# Patient Record
Sex: FEMALE | Race: WHITE | NOT HISPANIC OR LATINO | Employment: PART TIME | URBAN - METROPOLITAN AREA
[De-identification: names, ages, dates, MRNs, and addresses within clinical notes are randomized per-mention and may not be internally consistent; named-entity substitution may affect disease eponyms.]

---

## 2018-04-05 ENCOUNTER — RECORDS - HEALTHEAST (OUTPATIENT)
Dept: LAB | Facility: CLINIC | Age: 33
End: 2018-04-05

## 2018-04-05 LAB
ALBUMIN SERPL-MCNC: 3.9 G/DL (ref 3.5–5)
ALP SERPL-CCNC: 79 U/L (ref 45–120)
ALT SERPL W P-5'-P-CCNC: 110 U/L (ref 0–45)
ANION GAP SERPL CALCULATED.3IONS-SCNC: 12 MMOL/L (ref 5–18)
AST SERPL W P-5'-P-CCNC: 50 U/L (ref 0–40)
BILIRUB SERPL-MCNC: 0.6 MG/DL (ref 0–1)
BUN SERPL-MCNC: 14 MG/DL (ref 8–22)
CALCIUM SERPL-MCNC: 10.1 MG/DL (ref 8.5–10.5)
CHLORIDE BLD-SCNC: 102 MMOL/L (ref 98–107)
CHOLEST SERPL-MCNC: 291 MG/DL
CO2 SERPL-SCNC: 23 MMOL/L (ref 22–31)
CREAT SERPL-MCNC: 0.78 MG/DL (ref 0.6–1.1)
ERYTHROCYTE [DISTWIDTH] IN BLOOD BY AUTOMATED COUNT: 11.9 % (ref 11–14.5)
FASTING STATUS PATIENT QL REPORTED: NO
GFR SERPL CREATININE-BSD FRML MDRD: >60 ML/MIN/1.73M2
GLUCOSE BLD-MCNC: 108 MG/DL (ref 70–125)
HCT VFR BLD AUTO: 42.5 % (ref 35–47)
HDLC SERPL-MCNC: 39 MG/DL
HGB BLD-MCNC: 14.8 G/DL (ref 12–16)
LDLC SERPL CALC-MCNC: 173 MG/DL
MCH RBC QN AUTO: 31.6 PG (ref 27–34)
MCHC RBC AUTO-ENTMCNC: 34.8 G/DL (ref 32–36)
MCV RBC AUTO: 91 FL (ref 80–100)
PLATELET # BLD AUTO: 346 THOU/UL (ref 140–440)
PMV BLD AUTO: 10.5 FL (ref 8.5–12.5)
POTASSIUM BLD-SCNC: 4.1 MMOL/L (ref 3.5–5)
PROT SERPL-MCNC: 7.8 G/DL (ref 6–8)
RBC # BLD AUTO: 4.69 MILL/UL (ref 3.8–5.4)
SODIUM SERPL-SCNC: 137 MMOL/L (ref 136–145)
TRIGL SERPL-MCNC: 395 MG/DL
TSH SERPL DL<=0.005 MIU/L-ACNC: 1.05 UIU/ML (ref 0.3–5)
WBC: 7.9 THOU/UL (ref 4–11)

## 2018-04-06 LAB
HPV SOURCE: NORMAL
HUMAN PAPILLOMA VIRUS 16 DNA: NEGATIVE
HUMAN PAPILLOMA VIRUS 18 DNA: NEGATIVE
HUMAN PAPILLOMA VIRUS FINAL DIAGNOSIS: NORMAL
HUMAN PAPILLOMA VIRUS OTHER HR: NEGATIVE
SPECIMEN DESCRIPTION: NORMAL

## 2018-04-11 ENCOUNTER — RECORDS - HEALTHEAST (OUTPATIENT)
Dept: ADMINISTRATIVE | Facility: OTHER | Age: 33
End: 2018-04-11

## 2018-06-20 ENCOUNTER — RECORDS - HEALTHEAST (OUTPATIENT)
Dept: LAB | Facility: CLINIC | Age: 33
End: 2018-06-20

## 2018-06-20 LAB
ALBUMIN SERPL-MCNC: 4 G/DL (ref 3.5–5)
ALP SERPL-CCNC: 69 U/L (ref 45–120)
ALT SERPL W P-5'-P-CCNC: 29 U/L (ref 0–45)
AST SERPL W P-5'-P-CCNC: 18 U/L (ref 0–40)
BILIRUB DIRECT SERPL-MCNC: <0.1 MG/DL
BILIRUB SERPL-MCNC: 0.5 MG/DL (ref 0–1)
PROT SERPL-MCNC: 7.6 G/DL (ref 6–8)

## 2018-10-16 ENCOUNTER — RECORDS - HEALTHEAST (OUTPATIENT)
Dept: LAB | Facility: CLINIC | Age: 33
End: 2018-10-16

## 2018-10-16 LAB
ALBUMIN SERPL-MCNC: 4.1 G/DL (ref 3.5–5)
ALP SERPL-CCNC: 68 U/L (ref 45–120)
ALT SERPL W P-5'-P-CCNC: 32 U/L (ref 0–45)
ANION GAP SERPL CALCULATED.3IONS-SCNC: 10 MMOL/L (ref 5–18)
AST SERPL W P-5'-P-CCNC: 22 U/L (ref 0–40)
BILIRUB SERPL-MCNC: 0.6 MG/DL (ref 0–1)
BUN SERPL-MCNC: 16 MG/DL (ref 8–22)
CALCIUM SERPL-MCNC: 9.7 MG/DL (ref 8.5–10.5)
CHLORIDE BLD-SCNC: 103 MMOL/L (ref 98–107)
CO2 SERPL-SCNC: 25 MMOL/L (ref 22–31)
CREAT SERPL-MCNC: 0.76 MG/DL (ref 0.6–1.1)
GFR SERPL CREATININE-BSD FRML MDRD: >60 ML/MIN/1.73M2
GLUCOSE BLD-MCNC: 84 MG/DL (ref 70–125)
POTASSIUM BLD-SCNC: 4.6 MMOL/L (ref 3.5–5)
PROT SERPL-MCNC: 7.5 G/DL (ref 6–8)
SODIUM SERPL-SCNC: 138 MMOL/L (ref 136–145)

## 2019-02-21 ENCOUNTER — RECORDS - HEALTHEAST (OUTPATIENT)
Dept: LAB | Facility: CLINIC | Age: 34
End: 2019-02-21

## 2019-02-23 LAB — BACTERIA SPEC CULT: NORMAL

## 2019-04-02 ENCOUNTER — RECORDS - HEALTHEAST (OUTPATIENT)
Dept: LAB | Facility: CLINIC | Age: 34
End: 2019-04-02

## 2019-04-02 LAB
ALBUMIN SERPL-MCNC: 4.4 G/DL (ref 3.5–5)
ALP SERPL-CCNC: 59 U/L (ref 45–120)
ALT SERPL W P-5'-P-CCNC: 27 U/L (ref 0–45)
ANION GAP SERPL CALCULATED.3IONS-SCNC: 11 MMOL/L (ref 5–18)
AST SERPL W P-5'-P-CCNC: 20 U/L (ref 0–40)
BILIRUB SERPL-MCNC: 0.9 MG/DL (ref 0–1)
BUN SERPL-MCNC: 11 MG/DL (ref 8–22)
CALCIUM SERPL-MCNC: 10 MG/DL (ref 8.5–10.5)
CHLORIDE BLD-SCNC: 103 MMOL/L (ref 98–107)
CHOLEST SERPL-MCNC: 250 MG/DL
CO2 SERPL-SCNC: 24 MMOL/L (ref 22–31)
CREAT SERPL-MCNC: 0.82 MG/DL (ref 0.6–1.1)
FASTING STATUS PATIENT QL REPORTED: ABNORMAL
GFR SERPL CREATININE-BSD FRML MDRD: >60 ML/MIN/1.73M2
GLUCOSE BLD-MCNC: 77 MG/DL (ref 70–125)
HDLC SERPL-MCNC: 36 MG/DL
LDLC SERPL CALC-MCNC: 190 MG/DL
POTASSIUM BLD-SCNC: 4.5 MMOL/L (ref 3.5–5)
PROT SERPL-MCNC: 7.6 G/DL (ref 6–8)
SODIUM SERPL-SCNC: 138 MMOL/L (ref 136–145)
TRIGL SERPL-MCNC: 118 MG/DL

## 2019-05-06 ENCOUNTER — RECORDS - HEALTHEAST (OUTPATIENT)
Dept: LAB | Facility: CLINIC | Age: 34
End: 2019-05-06

## 2019-05-06 LAB
CHOLEST SERPL-MCNC: 145 MG/DL
FASTING STATUS PATIENT QL REPORTED: YES
HDLC SERPL-MCNC: 32 MG/DL
LDLC SERPL CALC-MCNC: 98 MG/DL
TRIGL SERPL-MCNC: 77 MG/DL

## 2019-08-07 ENCOUNTER — RECORDS - HEALTHEAST (OUTPATIENT)
Dept: LAB | Facility: CLINIC | Age: 34
End: 2019-08-07

## 2019-08-07 LAB — CK SERPL-CCNC: 130 U/L (ref 30–190)

## 2019-10-01 ENCOUNTER — RECORDS - HEALTHEAST (OUTPATIENT)
Dept: LAB | Facility: CLINIC | Age: 34
End: 2019-10-01

## 2019-10-01 LAB
ALBUMIN SERPL-MCNC: 4.7 G/DL (ref 3.5–5)
ALP SERPL-CCNC: 81 U/L (ref 45–120)
ALT SERPL W P-5'-P-CCNC: 35 U/L (ref 0–45)
ANION GAP SERPL CALCULATED.3IONS-SCNC: 13 MMOL/L (ref 5–18)
AST SERPL W P-5'-P-CCNC: 26 U/L (ref 0–40)
BILIRUB SERPL-MCNC: 1 MG/DL (ref 0–1)
BUN SERPL-MCNC: 12 MG/DL (ref 8–22)
CALCIUM SERPL-MCNC: 10 MG/DL (ref 8.5–10.5)
CHLORIDE BLD-SCNC: 102 MMOL/L (ref 98–107)
CO2 SERPL-SCNC: 24 MMOL/L (ref 22–31)
CREAT SERPL-MCNC: 0.82 MG/DL (ref 0.6–1.1)
ERYTHROCYTE [DISTWIDTH] IN BLOOD BY AUTOMATED COUNT: 11.9 % (ref 11–14.5)
GFR SERPL CREATININE-BSD FRML MDRD: >60 ML/MIN/1.73M2
GLUCOSE BLD-MCNC: 74 MG/DL (ref 70–125)
HCT VFR BLD AUTO: 41.8 % (ref 35–47)
HGB BLD-MCNC: 14 G/DL (ref 12–16)
MCH RBC QN AUTO: 30.4 PG (ref 27–34)
MCHC RBC AUTO-ENTMCNC: 33.5 G/DL (ref 32–36)
MCV RBC AUTO: 91 FL (ref 80–100)
PLATELET # BLD AUTO: 268 THOU/UL (ref 140–440)
PMV BLD AUTO: 10.8 FL (ref 8.5–12.5)
POTASSIUM BLD-SCNC: 3.8 MMOL/L (ref 3.5–5)
PROT SERPL-MCNC: 7.7 G/DL (ref 6–8)
RBC # BLD AUTO: 4.6 MILL/UL (ref 3.8–5.4)
SODIUM SERPL-SCNC: 139 MMOL/L (ref 136–145)
WBC: 5.9 THOU/UL (ref 4–11)

## 2020-01-03 ENCOUNTER — RECORDS - HEALTHEAST (OUTPATIENT)
Dept: LAB | Facility: CLINIC | Age: 35
End: 2020-01-03

## 2020-01-05 LAB — BACTERIA SPEC CULT: NORMAL

## 2020-01-31 ENCOUNTER — RECORDS - HEALTHEAST (OUTPATIENT)
Dept: LAB | Facility: CLINIC | Age: 35
End: 2020-01-31

## 2020-02-02 LAB — BACTERIA SPEC CULT: NORMAL

## 2020-07-06 ENCOUNTER — RECORDS - HEALTHEAST (OUTPATIENT)
Dept: LAB | Facility: CLINIC | Age: 35
End: 2020-07-06

## 2020-07-06 LAB
ALBUMIN SERPL-MCNC: 4.2 G/DL (ref 3.5–5)
ALP SERPL-CCNC: 80 U/L (ref 45–120)
ALT SERPL W P-5'-P-CCNC: 66 U/L (ref 0–45)
ANION GAP SERPL CALCULATED.3IONS-SCNC: 11 MMOL/L (ref 5–18)
AST SERPL W P-5'-P-CCNC: 35 U/L (ref 0–40)
BILIRUB SERPL-MCNC: 1 MG/DL (ref 0–1)
BUN SERPL-MCNC: 12 MG/DL (ref 8–22)
CALCIUM SERPL-MCNC: 9.5 MG/DL (ref 8.5–10.5)
CHLORIDE BLD-SCNC: 100 MMOL/L (ref 98–107)
CHOLEST SERPL-MCNC: 140 MG/DL
CO2 SERPL-SCNC: 27 MMOL/L (ref 22–31)
CREAT SERPL-MCNC: 0.81 MG/DL (ref 0.6–1.1)
FASTING STATUS PATIENT QL REPORTED: ABNORMAL
GFR SERPL CREATININE-BSD FRML MDRD: >60 ML/MIN/1.73M2
GLUCOSE BLD-MCNC: 80 MG/DL (ref 70–125)
HDLC SERPL-MCNC: 40 MG/DL
LDLC SERPL CALC-MCNC: 80 MG/DL
POTASSIUM BLD-SCNC: 4.5 MMOL/L (ref 3.5–5)
PROT SERPL-MCNC: 7.4 G/DL (ref 6–8)
SODIUM SERPL-SCNC: 138 MMOL/L (ref 136–145)
TRIGL SERPL-MCNC: 101 MG/DL

## 2020-08-13 ENCOUNTER — RECORDS - HEALTHEAST (OUTPATIENT)
Dept: LAB | Facility: CLINIC | Age: 35
End: 2020-08-13

## 2020-08-13 LAB — TSH SERPL DL<=0.005 MIU/L-ACNC: 0.81 UIU/ML (ref 0.3–5)

## 2020-09-15 ENCOUNTER — RECORDS - HEALTHEAST (OUTPATIENT)
Dept: LAB | Facility: CLINIC | Age: 35
End: 2020-09-15

## 2020-09-15 LAB
ALBUMIN SERPL-MCNC: 4.7 G/DL (ref 3.5–5)
ALP SERPL-CCNC: 70 U/L (ref 45–120)
ALT SERPL W P-5'-P-CCNC: 37 U/L (ref 0–45)
ANION GAP SERPL CALCULATED.3IONS-SCNC: 11 MMOL/L (ref 5–18)
AST SERPL W P-5'-P-CCNC: 24 U/L (ref 0–40)
BILIRUB SERPL-MCNC: 0.6 MG/DL (ref 0–1)
BUN SERPL-MCNC: 10 MG/DL (ref 8–22)
CALCIUM SERPL-MCNC: 9.7 MG/DL (ref 8.5–10.5)
CHLORIDE BLD-SCNC: 102 MMOL/L (ref 98–107)
CHOLEST SERPL-MCNC: 129 MG/DL
CO2 SERPL-SCNC: 25 MMOL/L (ref 22–31)
CREAT SERPL-MCNC: 0.79 MG/DL (ref 0.6–1.1)
FASTING STATUS PATIENT QL REPORTED: ABNORMAL
GFR SERPL CREATININE-BSD FRML MDRD: >60 ML/MIN/1.73M2
GLUCOSE BLD-MCNC: 84 MG/DL (ref 70–125)
HDLC SERPL-MCNC: 42 MG/DL
LDLC SERPL CALC-MCNC: 74 MG/DL
POTASSIUM BLD-SCNC: 4 MMOL/L (ref 3.5–5)
PROT SERPL-MCNC: 7.7 G/DL (ref 6–8)
SODIUM SERPL-SCNC: 138 MMOL/L (ref 136–145)
TRIGL SERPL-MCNC: 64 MG/DL

## 2020-12-14 ENCOUNTER — RECORDS - HEALTHEAST (OUTPATIENT)
Dept: LAB | Facility: CLINIC | Age: 35
End: 2020-12-14

## 2020-12-15 LAB — BACTERIA SPEC CULT: NO GROWTH

## 2021-05-29 ENCOUNTER — RECORDS - HEALTHEAST (OUTPATIENT)
Dept: ADMINISTRATIVE | Facility: CLINIC | Age: 36
End: 2021-05-29

## 2021-06-09 ENCOUNTER — RECORDS - HEALTHEAST (OUTPATIENT)
Dept: LAB | Facility: CLINIC | Age: 36
End: 2021-06-09

## 2021-06-09 LAB — POTASSIUM BLD-SCNC: 4.2 MMOL/L (ref 3.5–5)

## 2021-06-19 ENCOUNTER — RECORDS - HEALTHEAST (OUTPATIENT)
Dept: LAB | Facility: CLINIC | Age: 36
End: 2021-06-19

## 2021-06-19 LAB
SARS-COV-2 PCR COMMENT: NORMAL
SARS-COV-2 RNA SPEC QL NAA+PROBE: NEGATIVE
SARS-COV-2 VIRUS SPECIMEN SOURCE: NORMAL

## 2021-07-28 ENCOUNTER — LAB REQUISITION (OUTPATIENT)
Dept: LAB | Facility: CLINIC | Age: 36
End: 2021-07-28

## 2021-07-28 DIAGNOSIS — E78.5 HYPERLIPIDEMIA, UNSPECIFIED: ICD-10-CM

## 2021-07-28 LAB
ALBUMIN SERPL-MCNC: 4.4 G/DL (ref 3.5–5)
ALP SERPL-CCNC: 63 U/L (ref 45–120)
ALT SERPL W P-5'-P-CCNC: 36 U/L (ref 0–45)
ANION GAP SERPL CALCULATED.3IONS-SCNC: 11 MMOL/L (ref 5–18)
AST SERPL W P-5'-P-CCNC: 20 U/L (ref 0–40)
BILIRUB SERPL-MCNC: 0.7 MG/DL (ref 0–1)
BUN SERPL-MCNC: 19 MG/DL (ref 8–22)
CALCIUM SERPL-MCNC: 9.8 MG/DL (ref 8.5–10.5)
CHLORIDE BLD-SCNC: 102 MMOL/L (ref 98–107)
CHOLEST SERPL-MCNC: 194 MG/DL
CO2 SERPL-SCNC: 28 MMOL/L (ref 22–31)
CREAT SERPL-MCNC: 0.82 MG/DL (ref 0.6–1.1)
GFR SERPL CREATININE-BSD FRML MDRD: >90 ML/MIN/1.73M2
GLUCOSE BLD-MCNC: 92 MG/DL (ref 70–125)
HDLC SERPL-MCNC: 53 MG/DL
LDLC SERPL CALC-MCNC: 123 MG/DL
POTASSIUM BLD-SCNC: 4.4 MMOL/L (ref 3.5–5)
PROT SERPL-MCNC: 7.6 G/DL (ref 6–8)
SODIUM SERPL-SCNC: 141 MMOL/L (ref 136–145)
TRIGL SERPL-MCNC: 90 MG/DL
TSH SERPL DL<=0.005 MIU/L-ACNC: 1.63 UIU/ML (ref 0.3–5)

## 2021-07-28 PROCEDURE — 82040 ASSAY OF SERUM ALBUMIN: CPT | Performed by: FAMILY MEDICINE

## 2021-07-28 PROCEDURE — 80061 LIPID PANEL: CPT | Performed by: FAMILY MEDICINE

## 2021-07-28 PROCEDURE — 36415 COLL VENOUS BLD VENIPUNCTURE: CPT | Performed by: FAMILY MEDICINE

## 2021-07-28 PROCEDURE — 84443 ASSAY THYROID STIM HORMONE: CPT | Performed by: FAMILY MEDICINE

## 2022-07-11 ENCOUNTER — LAB REQUISITION (OUTPATIENT)
Dept: LAB | Facility: CLINIC | Age: 37
End: 2022-07-11

## 2022-07-11 DIAGNOSIS — E66.9 OBESITY, UNSPECIFIED: ICD-10-CM

## 2022-07-11 DIAGNOSIS — E78.5 HYPERLIPIDEMIA, UNSPECIFIED: ICD-10-CM

## 2022-07-11 LAB
ALBUMIN SERPL BCG-MCNC: 4.5 G/DL (ref 3.5–5.2)
ALP SERPL-CCNC: 78 U/L (ref 35–104)
ALT SERPL W P-5'-P-CCNC: 26 U/L (ref 10–35)
ANION GAP SERPL CALCULATED.3IONS-SCNC: 10 MMOL/L (ref 7–15)
AST SERPL W P-5'-P-CCNC: 20 U/L (ref 10–35)
BILIRUB SERPL-MCNC: 0.7 MG/DL
BUN SERPL-MCNC: 13.5 MG/DL (ref 6–20)
CALCIUM SERPL-MCNC: 9.2 MG/DL (ref 8.6–10)
CHLORIDE SERPL-SCNC: 101 MMOL/L (ref 98–107)
CHOLEST SERPL-MCNC: 151 MG/DL
CREAT SERPL-MCNC: 0.78 MG/DL (ref 0.51–0.95)
DEPRECATED HCO3 PLAS-SCNC: 28 MMOL/L (ref 22–29)
GFR SERPL CREATININE-BSD FRML MDRD: >90 ML/MIN/1.73M2
GLUCOSE SERPL-MCNC: 88 MG/DL (ref 70–99)
HDLC SERPL-MCNC: 41 MG/DL
LDLC SERPL CALC-MCNC: 89 MG/DL
NONHDLC SERPL-MCNC: 110 MG/DL
POTASSIUM SERPL-SCNC: 4.3 MMOL/L (ref 3.4–5.3)
PROT SERPL-MCNC: 7 G/DL (ref 6.4–8.3)
SODIUM SERPL-SCNC: 139 MMOL/L (ref 136–145)
TRIGL SERPL-MCNC: 104 MG/DL
TSH SERPL DL<=0.005 MIU/L-ACNC: 1.37 UIU/ML (ref 0.3–4.2)

## 2022-07-11 PROCEDURE — 80053 COMPREHEN METABOLIC PANEL: CPT | Performed by: FAMILY MEDICINE

## 2022-07-11 PROCEDURE — 80061 LIPID PANEL: CPT | Performed by: FAMILY MEDICINE

## 2022-07-11 PROCEDURE — 84443 ASSAY THYROID STIM HORMONE: CPT | Performed by: FAMILY MEDICINE

## 2023-02-06 RX ORDER — ATORVASTATIN CALCIUM 40 MG/1
40 TABLET, FILM COATED ORAL EVERY EVENING
COMMUNITY

## 2023-02-06 RX ORDER — ESCITALOPRAM OXALATE 10 MG/1
15 TABLET ORAL DAILY
COMMUNITY

## 2023-02-06 RX ORDER — CHLORAL HYDRATE 500 MG
1 CAPSULE ORAL DAILY
COMMUNITY

## 2023-02-06 RX ORDER — DOXEPIN HYDROCHLORIDE 10 MG/1
10 CAPSULE ORAL
Status: ON HOLD | COMMUNITY
End: 2023-03-17

## 2023-02-06 RX ORDER — MECLIZINE HYDROCHLORIDE 25 MG/1
25 TABLET ORAL DAILY PRN
COMMUNITY

## 2023-02-06 RX ORDER — ALPRAZOLAM 0.5 MG
0.5 TABLET ORAL DAILY PRN
COMMUNITY

## 2023-02-06 RX ORDER — MULTIPLE VITAMINS W/ MINERALS TAB 9MG-400MCG
1 TAB ORAL DAILY
COMMUNITY

## 2023-02-27 ENCOUNTER — LAB REQUISITION (OUTPATIENT)
Dept: LAB | Facility: CLINIC | Age: 38
End: 2023-02-27

## 2023-02-27 DIAGNOSIS — E78.5 HYPERLIPIDEMIA, UNSPECIFIED: ICD-10-CM

## 2023-02-27 DIAGNOSIS — M17.32 UNILATERAL POST-TRAUMATIC OSTEOARTHRITIS, LEFT KNEE: ICD-10-CM

## 2023-02-27 PROCEDURE — 85025 COMPLETE CBC W/AUTO DIFF WBC: CPT | Performed by: STUDENT IN AN ORGANIZED HEALTH CARE EDUCATION/TRAINING PROGRAM

## 2023-02-27 PROCEDURE — 80053 COMPREHEN METABOLIC PANEL: CPT | Performed by: STUDENT IN AN ORGANIZED HEALTH CARE EDUCATION/TRAINING PROGRAM

## 2023-02-28 LAB
ALBUMIN SERPL BCG-MCNC: 4.4 G/DL (ref 3.5–5.2)
ALP SERPL-CCNC: 95 U/L (ref 35–104)
ALT SERPL W P-5'-P-CCNC: 55 U/L (ref 10–35)
ANION GAP SERPL CALCULATED.3IONS-SCNC: 13 MMOL/L (ref 7–15)
AST SERPL W P-5'-P-CCNC: 38 U/L (ref 10–35)
BASOPHILS # BLD AUTO: 0 10E3/UL (ref 0–0.2)
BASOPHILS NFR BLD AUTO: 1 %
BILIRUB SERPL-MCNC: 0.4 MG/DL
BUN SERPL-MCNC: 11.3 MG/DL (ref 6–20)
CALCIUM SERPL-MCNC: 9.5 MG/DL (ref 8.6–10)
CHLORIDE SERPL-SCNC: 102 MMOL/L (ref 98–107)
CREAT SERPL-MCNC: 0.75 MG/DL (ref 0.51–0.95)
DEPRECATED HCO3 PLAS-SCNC: 23 MMOL/L (ref 22–29)
EOSINOPHIL # BLD AUTO: 0.1 10E3/UL (ref 0–0.7)
EOSINOPHIL NFR BLD AUTO: 1 %
ERYTHROCYTE [DISTWIDTH] IN BLOOD BY AUTOMATED COUNT: 12 % (ref 10–15)
GFR SERPL CREATININE-BSD FRML MDRD: >90 ML/MIN/1.73M2
GLUCOSE SERPL-MCNC: 98 MG/DL (ref 70–99)
HCT VFR BLD AUTO: 42.6 % (ref 35–47)
HGB BLD-MCNC: 14.2 G/DL (ref 11.7–15.7)
IMM GRANULOCYTES # BLD: 0 10E3/UL
IMM GRANULOCYTES NFR BLD: 1 %
LYMPHOCYTES # BLD AUTO: 1.9 10E3/UL (ref 0.8–5.3)
LYMPHOCYTES NFR BLD AUTO: 29 %
MCH RBC QN AUTO: 30.4 PG (ref 26.5–33)
MCHC RBC AUTO-ENTMCNC: 33.3 G/DL (ref 31.5–36.5)
MCV RBC AUTO: 91 FL (ref 78–100)
MONOCYTES # BLD AUTO: 0.5 10E3/UL (ref 0–1.3)
MONOCYTES NFR BLD AUTO: 7 %
NEUTROPHILS # BLD AUTO: 4.1 10E3/UL (ref 1.6–8.3)
NEUTROPHILS NFR BLD AUTO: 61 %
NRBC # BLD AUTO: 0 10E3/UL
NRBC BLD AUTO-RTO: 0 /100
PLAT MORPH BLD: NORMAL
PLATELET # BLD AUTO: 278 10E3/UL (ref 150–450)
POTASSIUM SERPL-SCNC: 4.1 MMOL/L (ref 3.4–5.3)
PROT SERPL-MCNC: 7.2 G/DL (ref 6.4–8.3)
RBC # BLD AUTO: 4.67 10E6/UL (ref 3.8–5.2)
RBC MORPH BLD: NORMAL
SODIUM SERPL-SCNC: 138 MMOL/L (ref 136–145)
WBC # BLD AUTO: 6.6 10E3/UL (ref 4–11)

## 2023-03-07 NOTE — PROGRESS NOTES
Discharge plan according to [facility]:       02/06/23 1151   Discharge Planning   Patient/Family Anticipates Transition to home   Concerns to be Addressed all concerns addressed in this encounter   Living Arrangements   People in Home parent(s)   Type of Residence Private Residence   Is your private residence a single family home or apartment? Single family home   Stair Railings, Within Home, Primary railings safe and in good condition   Bathroom Shower/Tub Tub/Shower unit   Equipment Currently Used at Home none   Support System   Support Systems Parent   Do you have someone available to stay with you one or two nights once you are home? Yes

## 2023-03-16 NOTE — TREATMENT PLAN
Orthopedic Surgery Pre-Op Plan: Crystal Galvan  pre-op review. This is NOT an H&P   Surgeon: Dr. Pro    [facility]: [facility]  Name of Surgery: Left Total Knee Arthroplasty  Date of Surgery: 3/17/23  H&P: Completed on 2/27/23 by Dr. Dillan Renteria at [facility].   History of ASA, NSAIDS, vitamin and/or herbal supplements within 10 days: Yes- Multivitamin, Fish Oil-patient instructed to hold these vitamins and supplements for 7 days before surgery.  History of blood thinners: No    Plan:   1) Discharge Plan: Home morning of POD 1 with assist of Parents. Please see Discharge Planning section near bottom of this note for further details.     2) PONV: Reports history of nausea and vomiting following previous anesthesia.  I recommend patient discusses this with preop nursing and anesthesia on the day of surgery.  She would likely benefit from antiemetics and other measures to prevent or minimize nausea/vomiting.     3) Hyperlipidemia: On atorvastatin.    4) History of Seizures: as a child. No seizures since childhood and is not on any anti-seizure medications.     5) Obstructive Sleep Apnea: CPAP.  Reminded to bring CPAP machine to the hospital and use it whenever sleeping or napping.    6) Morbid Obesity: BMI 43, Wt: 285 lbs at recent preop exam.  I recommend continued efforts at safe weight loss following recovery from surgery. If patient is interested in further assistance with weight loss, please consider referral to [facility]. They offer both non-surgical and surgical evidence-based weight loss strategies. Call [phone] to schedule a consultation to learn more.      7) History of Motion Sickness: on meclizine.    8) Generalized Anxiety Disorder: On escitalopram, alprazolam (Xanax) and doxepin.     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 Dr. Pro confirmed he is aware of this allergy and has plans to use specific components ([employer])  because of this.      Patient appears medically optimized for upcoming surgery. I would recommend Hospitalist Consult to assist with medical management. Please call me below with any questions on this patient.       Review of Systems Notable for: PONV, Hyperlipidemia, history of seizures-as a child-no seizures since childhood, obstructive sleep apnea-on CPAP, morbid obesity, history of motion sickness, generalized anxiety disorder, nickel allergy-severe with rejection.     Past Medical History:   Past Medical History:   Diagnosis Date     Anxiety      Arthritis      Hyperlipidemia      Motion sickness      Obese      OCD (obsessive compulsive disorder)      PONV (postoperative nausea and vomiting)      PTSD (post-traumatic stress disorder)      Seizures (H)     none since childhood     Sleep apnea      Past Surgical History:   Procedure Laterality Date     ORTHOPEDIC SURGERY Left     arthroscopy       Current Medications:  Patient's Medications   New Prescriptions    No medications on file   Previous Medications    ALPRAZOLAM (XANAX) 0.5 MG TABLET    Take by mouth 3 times daily as needed for anxiety    ATORVASTATIN (LIPITOR) 40 MG TABLET    Take 40 mg by mouth daily    DOXEPIN (SINEQUAN) 10 MG CAPSULE    Take 10 mg by mouth nightly as needed 1/2 tab    ESCITALOPRAM (LEXAPRO) 10 MG TABLET    Take 10 mg by mouth daily 1 1/2 tabs    FISH OIL-OMEGA-3 FATTY ACIDS 500 MG CAPSULE        MECLIZINE (ANTIVERT) 25 MG TABLET    Take 25 mg by mouth 3 times daily as needed for dizziness    MULTIVITAMIN W/MINERALS (THERA-VIT-M) TABLET    Take 1 tablet by mouth daily   Modified Medications    No medications on file   Discontinued Medications    No medications on file       ALLERGIES:  Allergies   Allergen Reactions     Aspirin Nausea and Vomiting     dizzy     Nickel      Severe reaction with rejection, Dr. Pro aware for TJA.      Latex Rash     Added based on information entered during case entry, please review and add reactions,  type, and severity as needed       Social History  Social History     Tobacco Use     Smoking status: Never     Smokeless tobacco: Never   Substance Use Topics     Alcohol use: Not Currently     Drug use: Not Currently       Any Abnormal Recent Diagnostics? Yes  AST 38, ALT 55 on 2/27/23: mildly elevated. Should not affect proceeding with surgery. PCP plans to recheck these in about 3 months.     Discharge Planning:   Discharge plan according to [facility]:      Home POD 1 with assist Parents    02/06/23 1151   Discharge Planning   Patient/Family Anticipates Transition to home   Concerns to be Addressed all concerns addressed in this encounter   Living Arrangements   People in Home parent(s)   Type of Residence Private Residence   Is your private residence a single family home or apartment? Single family home   Stair Railings, Within Home, Primary railings safe and in good condition   Bathroom Shower/Tub Tub/Shower unit   Equipment Currently Used at Home none   Support System   Support Systems Parent   Do you have someone available to stay with you one or two nights once you are home? Yes       YOVANI Carney, CNP   Advanced Practice Nurse Navigator- Orthopedics  [facility]   Phone: [phone]

## 2023-03-17 ENCOUNTER — HOSPITAL ENCOUNTER (OUTPATIENT)
Facility: CLINIC | Age: 38
Discharge: HOME | End: 2023-03-18
Attending: ORTHOPAEDIC SURGERY | Admitting: ORTHOPAEDIC SURGERY
Payer: COMMERCIAL

## 2023-03-17 ENCOUNTER — APPOINTMENT (OUTPATIENT)
Dept: PHYSICAL THERAPY | Facility: CLINIC | Age: 38
End: 2023-03-17
Attending: ORTHOPAEDIC SURGERY
Payer: COMMERCIAL

## 2023-03-17 ENCOUNTER — APPOINTMENT (OUTPATIENT)
Dept: RADIOLOGY | Facility: CLINIC | Age: 38
End: 2023-03-17
Attending: ORTHOPAEDIC SURGERY
Payer: COMMERCIAL

## 2023-03-17 ENCOUNTER — ANESTHESIA (OUTPATIENT)
Dept: SURGERY | Facility: CLINIC | Age: 38
End: 2023-03-17
Payer: COMMERCIAL

## 2023-03-17 ENCOUNTER — ANESTHESIA EVENT (OUTPATIENT)
Dept: SURGERY | Facility: CLINIC | Age: 38
End: 2023-03-17
Payer: COMMERCIAL

## 2023-03-17 DIAGNOSIS — Z96.60 STATUS POST JOINT REPLACEMENT: Primary | ICD-10-CM

## 2023-03-17 PROCEDURE — 250N000011 HC RX IP 250 OP 636

## 2023-03-17 PROCEDURE — 97116 GAIT TRAINING THERAPY: CPT | Mod: GP

## 2023-03-17 PROCEDURE — 97110 THERAPEUTIC EXERCISES: CPT | Mod: GP

## 2023-03-17 PROCEDURE — 258N000003 HC RX IP 258 OP 636: Performed by: NURSE ANESTHETIST, CERTIFIED REGISTERED

## 2023-03-17 PROCEDURE — 250N000013 HC RX MED GY IP 250 OP 250 PS 637: Performed by: PHYSICIAN ASSISTANT

## 2023-03-17 PROCEDURE — 250N000011 HC RX IP 250 OP 636: Performed by: ORTHOPAEDIC SURGERY

## 2023-03-17 PROCEDURE — 250N000009 HC RX 250: Performed by: ANESTHESIOLOGY

## 2023-03-17 PROCEDURE — 97161 PT EVAL LOW COMPLEX 20 MIN: CPT | Mod: GP

## 2023-03-17 PROCEDURE — 999N000065 XR KNEE PORT LEFT 1/2 VIEWS: Mod: LT

## 2023-03-17 PROCEDURE — 710N000010 HC RECOVERY PHASE 1, LEVEL 2, PER MIN: Performed by: ORTHOPAEDIC SURGERY

## 2023-03-17 PROCEDURE — 99204 OFFICE O/P NEW MOD 45 MIN: CPT | Performed by: STUDENT IN AN ORGANIZED HEALTH CARE EDUCATION/TRAINING PROGRAM

## 2023-03-17 PROCEDURE — 250N000009 HC RX 250: Performed by: PHYSICIAN ASSISTANT

## 2023-03-17 PROCEDURE — 272N000001 HC OR GENERAL SUPPLY STERILE: Performed by: ORTHOPAEDIC SURGERY

## 2023-03-17 PROCEDURE — 258N000003 HC RX IP 258 OP 636

## 2023-03-17 PROCEDURE — 250N000011 HC RX IP 250 OP 636: Performed by: PHYSICIAN ASSISTANT

## 2023-03-17 PROCEDURE — 250N000009 HC RX 250: Performed by: NURSE ANESTHETIST, CERTIFIED REGISTERED

## 2023-03-17 PROCEDURE — 250N000013 HC RX MED GY IP 250 OP 250 PS 637

## 2023-03-17 PROCEDURE — 360N000077 HC SURGERY LEVEL 4, PER MIN: Performed by: ORTHOPAEDIC SURGERY

## 2023-03-17 PROCEDURE — 250N000013 HC RX MED GY IP 250 OP 250 PS 637: Performed by: ORTHOPAEDIC SURGERY

## 2023-03-17 PROCEDURE — 250N000011 HC RX IP 250 OP 636: Performed by: NURSE ANESTHETIST, CERTIFIED REGISTERED

## 2023-03-17 PROCEDURE — 258N000003 HC RX IP 258 OP 636: Performed by: ORTHOPAEDIC SURGERY

## 2023-03-17 PROCEDURE — 999N000141 HC STATISTIC PRE-PROCEDURE NURSING ASSESSMENT: Performed by: ORTHOPAEDIC SURGERY

## 2023-03-17 PROCEDURE — C1776 JOINT DEVICE (IMPLANTABLE): HCPCS | Performed by: ORTHOPAEDIC SURGERY

## 2023-03-17 PROCEDURE — 370N000017 HC ANESTHESIA TECHNICAL FEE, PER MIN: Performed by: ORTHOPAEDIC SURGERY

## 2023-03-17 DEVICE — LEGION CRUCIATE RETAINING OXINIUM                                    FEMORAL SIZE 5 LEFT
Type: IMPLANTABLE DEVICE | Site: KNEE | Status: FUNCTIONAL
Brand: LEGION

## 2023-03-17 DEVICE — LEGION HIGHLY CROSS LINKED                                    POLYETHYLENE DISHED INSERT SIZE 3-4 9MM
Type: IMPLANTABLE DEVICE | Site: KNEE | Status: FUNCTIONAL
Brand: LEGION

## 2023-03-17 DEVICE — GENESIS II NON-POROUS TIBIAL                                    BASEPLATE SIZE 4 LEFT
Type: IMPLANTABLE DEVICE | Site: KNEE | Status: FUNCTIONAL
Brand: GENESIS II

## 2023-03-17 DEVICE — IMPLANTABLE DEVICE: Type: IMPLANTABLE DEVICE | Site: KNEE | Status: FUNCTIONAL

## 2023-03-17 RX ORDER — CEFAZOLIN SODIUM/WATER 3 G/30 ML
3 SYRINGE (ML) INTRAVENOUS SEE ADMIN INSTRUCTIONS
Status: DISCONTINUED | OUTPATIENT
Start: 2023-03-17 | End: 2023-03-17 | Stop reason: HOSPADM

## 2023-03-17 RX ORDER — PROPOFOL 10 MG/ML
INJECTION, EMULSION INTRAVENOUS CONTINUOUS PRN
Status: DISCONTINUED | OUTPATIENT
Start: 2023-03-17 | End: 2023-03-17

## 2023-03-17 RX ORDER — OXYCODONE HYDROCHLORIDE 5 MG/1
10 TABLET ORAL EVERY 4 HOURS PRN
Status: DISCONTINUED | OUTPATIENT
Start: 2023-03-17 | End: 2023-03-18 | Stop reason: HOSPADM

## 2023-03-17 RX ORDER — FENTANYL CITRATE 50 UG/ML
50 INJECTION, SOLUTION INTRAMUSCULAR; INTRAVENOUS EVERY 5 MIN PRN
Status: DISCONTINUED | OUTPATIENT
Start: 2023-03-17 | End: 2023-03-17 | Stop reason: HOSPADM

## 2023-03-17 RX ORDER — SODIUM CHLORIDE, SODIUM LACTATE, POTASSIUM CHLORIDE, CALCIUM CHLORIDE 600; 310; 30; 20 MG/100ML; MG/100ML; MG/100ML; MG/100ML
INJECTION, SOLUTION INTRAVENOUS CONTINUOUS
Status: DISCONTINUED | OUTPATIENT
Start: 2023-03-17 | End: 2023-03-17 | Stop reason: HOSPADM

## 2023-03-17 RX ORDER — CEFAZOLIN SODIUM 2 G/100ML
2 INJECTION, SOLUTION INTRAVENOUS EVERY 8 HOURS
Status: COMPLETED | OUTPATIENT
Start: 2023-03-17 | End: 2023-03-18

## 2023-03-17 RX ORDER — POLYETHYLENE GLYCOL 3350 17 G/17G
17 POWDER, FOR SOLUTION ORAL DAILY
Status: DISCONTINUED | OUTPATIENT
Start: 2023-03-18 | End: 2023-03-18 | Stop reason: HOSPADM

## 2023-03-17 RX ORDER — CEFADROXIL 500 MG/1
500 CAPSULE ORAL 2 TIMES DAILY
Qty: 28 CAPSULE | Refills: 0 | Status: SHIPPED | OUTPATIENT
Start: 2023-03-17

## 2023-03-17 RX ORDER — FENTANYL CITRATE 50 UG/ML
50 INJECTION, SOLUTION INTRAMUSCULAR; INTRAVENOUS
Status: DISCONTINUED | OUTPATIENT
Start: 2023-03-17 | End: 2023-03-17 | Stop reason: HOSPADM

## 2023-03-17 RX ORDER — ONDANSETRON 4 MG/1
4 TABLET, ORALLY DISINTEGRATING ORAL EVERY 6 HOURS PRN
Status: DISCONTINUED | OUTPATIENT
Start: 2023-03-17 | End: 2023-03-18 | Stop reason: HOSPADM

## 2023-03-17 RX ORDER — VIT C/B6/B5/MAGNESIUM/HERB 173 50-5-6-5MG
1 CAPSULE ORAL EVERY EVENING
COMMUNITY

## 2023-03-17 RX ORDER — ONDANSETRON 4 MG/1
4 TABLET, ORALLY DISINTEGRATING ORAL EVERY 30 MIN PRN
Status: DISCONTINUED | OUTPATIENT
Start: 2023-03-17 | End: 2023-03-17 | Stop reason: HOSPADM

## 2023-03-17 RX ORDER — BISACODYL 10 MG
10 SUPPOSITORY, RECTAL RECTAL DAILY PRN
Status: DISCONTINUED | OUTPATIENT
Start: 2023-03-17 | End: 2023-03-18 | Stop reason: HOSPADM

## 2023-03-17 RX ORDER — TRANEXAMIC ACID 650 MG/1
1950 TABLET ORAL ONCE
Status: COMPLETED | OUTPATIENT
Start: 2023-03-17 | End: 2023-03-17

## 2023-03-17 RX ORDER — ALPRAZOLAM 0.5 MG
0.5 TABLET ORAL DAILY PRN
Status: DISCONTINUED | OUTPATIENT
Start: 2023-03-17 | End: 2023-03-18 | Stop reason: HOSPADM

## 2023-03-17 RX ORDER — HYDROMORPHONE HCL IN WATER/PF 6 MG/30 ML
0.4 PATIENT CONTROLLED ANALGESIA SYRINGE INTRAVENOUS EVERY 5 MIN PRN
Status: DISCONTINUED | OUTPATIENT
Start: 2023-03-17 | End: 2023-03-17 | Stop reason: HOSPADM

## 2023-03-17 RX ORDER — SODIUM CHLORIDE, SODIUM LACTATE, POTASSIUM CHLORIDE, CALCIUM CHLORIDE 600; 310; 30; 20 MG/100ML; MG/100ML; MG/100ML; MG/100ML
INJECTION, SOLUTION INTRAVENOUS CONTINUOUS
Status: DISCONTINUED | OUTPATIENT
Start: 2023-03-17 | End: 2023-03-18 | Stop reason: HOSPADM

## 2023-03-17 RX ORDER — ASPIRIN 81 MG/1
81 TABLET ORAL 2 TIMES DAILY
Status: DISCONTINUED | OUTPATIENT
Start: 2023-03-17 | End: 2023-03-17

## 2023-03-17 RX ORDER — VANCOMYCIN HYDROCHLORIDE 1 G/20ML
INJECTION, POWDER, LYOPHILIZED, FOR SOLUTION INTRAVENOUS PRN
Status: DISCONTINUED | OUTPATIENT
Start: 2023-03-17 | End: 2023-03-17 | Stop reason: HOSPADM

## 2023-03-17 RX ORDER — HYDROMORPHONE HCL IN WATER/PF 6 MG/30 ML
0.4 PATIENT CONTROLLED ANALGESIA SYRINGE INTRAVENOUS
Status: DISCONTINUED | OUTPATIENT
Start: 2023-03-17 | End: 2023-03-18 | Stop reason: HOSPADM

## 2023-03-17 RX ORDER — PROCHLORPERAZINE MALEATE 10 MG
10 TABLET ORAL EVERY 6 HOURS PRN
Status: DISCONTINUED | OUTPATIENT
Start: 2023-03-17 | End: 2023-03-18 | Stop reason: HOSPADM

## 2023-03-17 RX ORDER — LIDOCAINE HYDROCHLORIDE 20 MG/ML
INJECTION, SOLUTION INFILTRATION; PERINEURAL PRN
Status: DISCONTINUED | OUTPATIENT
Start: 2023-03-17 | End: 2023-03-17

## 2023-03-17 RX ORDER — ATORVASTATIN CALCIUM 40 MG/1
40 TABLET, FILM COATED ORAL EVERY EVENING
Status: DISCONTINUED | OUTPATIENT
Start: 2023-03-18 | End: 2023-03-18 | Stop reason: HOSPADM

## 2023-03-17 RX ORDER — DOXEPIN HYDROCHLORIDE 10 MG/ML
2 SOLUTION ORAL
COMMUNITY

## 2023-03-17 RX ORDER — FENTANYL CITRATE 50 UG/ML
25 INJECTION, SOLUTION INTRAMUSCULAR; INTRAVENOUS EVERY 5 MIN PRN
Status: DISCONTINUED | OUTPATIENT
Start: 2023-03-17 | End: 2023-03-17 | Stop reason: HOSPADM

## 2023-03-17 RX ORDER — DOXEPIN HYDROCHLORIDE 10 MG/ML
2 SOLUTION ORAL
Status: DISCONTINUED | OUTPATIENT
Start: 2023-03-17 | End: 2023-03-18 | Stop reason: HOSPADM

## 2023-03-17 RX ORDER — ONDANSETRON 2 MG/ML
4 INJECTION INTRAMUSCULAR; INTRAVENOUS EVERY 30 MIN PRN
Status: DISCONTINUED | OUTPATIENT
Start: 2023-03-17 | End: 2023-03-17 | Stop reason: HOSPADM

## 2023-03-17 RX ORDER — DEXAMETHASONE SODIUM PHOSPHATE 4 MG/ML
INJECTION, SOLUTION INTRA-ARTICULAR; INTRALESIONAL; INTRAMUSCULAR; INTRAVENOUS; SOFT TISSUE PRN
Status: DISCONTINUED | OUTPATIENT
Start: 2023-03-17 | End: 2023-03-17

## 2023-03-17 RX ORDER — ACETAMINOPHEN 325 MG/1
975 TABLET ORAL ONCE
Status: COMPLETED | OUTPATIENT
Start: 2023-03-17 | End: 2023-03-17

## 2023-03-17 RX ORDER — NALOXONE HYDROCHLORIDE 0.4 MG/ML
0.2 INJECTION, SOLUTION INTRAMUSCULAR; INTRAVENOUS; SUBCUTANEOUS
Status: DISCONTINUED | OUTPATIENT
Start: 2023-03-17 | End: 2023-03-18 | Stop reason: HOSPADM

## 2023-03-17 RX ORDER — CEFAZOLIN SODIUM/WATER 3 G/30 ML
3 SYRINGE (ML) INTRAVENOUS
Status: COMPLETED | OUTPATIENT
Start: 2023-03-17 | End: 2023-03-17

## 2023-03-17 RX ORDER — LIDOCAINE 40 MG/G
CREAM TOPICAL
Status: DISCONTINUED | OUTPATIENT
Start: 2023-03-17 | End: 2023-03-17 | Stop reason: HOSPADM

## 2023-03-17 RX ORDER — HYDROMORPHONE HCL IN WATER/PF 6 MG/30 ML
0.2 PATIENT CONTROLLED ANALGESIA SYRINGE INTRAVENOUS
Status: DISCONTINUED | OUTPATIENT
Start: 2023-03-17 | End: 2023-03-18 | Stop reason: HOSPADM

## 2023-03-17 RX ORDER — VANCOMYCIN HYDROCHLORIDE 1 G/20ML
INJECTION, POWDER, LYOPHILIZED, FOR SOLUTION INTRAVENOUS
Status: DISCONTINUED
Start: 2023-03-17 | End: 2023-03-17 | Stop reason: HOSPADM

## 2023-03-17 RX ORDER — PROPOFOL 10 MG/ML
INJECTION, EMULSION INTRAVENOUS PRN
Status: DISCONTINUED | OUTPATIENT
Start: 2023-03-17 | End: 2023-03-17

## 2023-03-17 RX ORDER — VITAMIN B COMPLEX
1 TABLET ORAL DAILY
COMMUNITY

## 2023-03-17 RX ORDER — ACETAMINOPHEN 325 MG/1
975 TABLET ORAL EVERY 8 HOURS
Status: DISCONTINUED | OUTPATIENT
Start: 2023-03-17 | End: 2023-03-18 | Stop reason: HOSPADM

## 2023-03-17 RX ORDER — ONDANSETRON 2 MG/ML
INJECTION INTRAMUSCULAR; INTRAVENOUS PRN
Status: DISCONTINUED | OUTPATIENT
Start: 2023-03-17 | End: 2023-03-17

## 2023-03-17 RX ORDER — FENTANYL CITRATE 50 UG/ML
100 INJECTION, SOLUTION INTRAMUSCULAR; INTRAVENOUS
Status: DISCONTINUED | OUTPATIENT
Start: 2023-03-17 | End: 2023-03-17 | Stop reason: HOSPADM

## 2023-03-17 RX ORDER — LIDOCAINE 40 MG/G
CREAM TOPICAL
Status: DISCONTINUED | OUTPATIENT
Start: 2023-03-17 | End: 2023-03-18 | Stop reason: HOSPADM

## 2023-03-17 RX ORDER — METHOCARBAMOL 750 MG/1
750 TABLET, FILM COATED ORAL EVERY 6 HOURS PRN
Status: DISCONTINUED | OUTPATIENT
Start: 2023-03-17 | End: 2023-03-18 | Stop reason: HOSPADM

## 2023-03-17 RX ORDER — KETOROLAC TROMETHAMINE 15 MG/ML
15 INJECTION, SOLUTION INTRAMUSCULAR; INTRAVENOUS EVERY 6 HOURS
Status: DISCONTINUED | OUTPATIENT
Start: 2023-03-17 | End: 2023-03-18 | Stop reason: HOSPADM

## 2023-03-17 RX ORDER — ONDANSETRON 2 MG/ML
4 INJECTION INTRAMUSCULAR; INTRAVENOUS EVERY 6 HOURS PRN
Status: DISCONTINUED | OUTPATIENT
Start: 2023-03-17 | End: 2023-03-18 | Stop reason: HOSPADM

## 2023-03-17 RX ORDER — NALOXONE HYDROCHLORIDE 0.4 MG/ML
0.4 INJECTION, SOLUTION INTRAMUSCULAR; INTRAVENOUS; SUBCUTANEOUS
Status: DISCONTINUED | OUTPATIENT
Start: 2023-03-17 | End: 2023-03-18 | Stop reason: HOSPADM

## 2023-03-17 RX ORDER — HYDROMORPHONE HCL IN WATER/PF 6 MG/30 ML
0.2 PATIENT CONTROLLED ANALGESIA SYRINGE INTRAVENOUS EVERY 5 MIN PRN
Status: DISCONTINUED | OUTPATIENT
Start: 2023-03-17 | End: 2023-03-17 | Stop reason: HOSPADM

## 2023-03-17 RX ORDER — OXYCODONE HYDROCHLORIDE 5 MG/1
5 TABLET ORAL EVERY 4 HOURS PRN
Status: DISCONTINUED | OUTPATIENT
Start: 2023-03-17 | End: 2023-03-18 | Stop reason: HOSPADM

## 2023-03-17 RX ORDER — HALOPERIDOL 5 MG/ML
1 INJECTION INTRAMUSCULAR
Status: DISCONTINUED | OUTPATIENT
Start: 2023-03-17 | End: 2023-03-17 | Stop reason: HOSPADM

## 2023-03-17 RX ORDER — BUPIVACAINE HYDROCHLORIDE 7.5 MG/ML
INJECTION, SOLUTION INTRASPINAL
Status: COMPLETED | OUTPATIENT
Start: 2023-03-17 | End: 2023-03-17

## 2023-03-17 RX ORDER — AMOXICILLIN 250 MG
1 CAPSULE ORAL 2 TIMES DAILY
Status: DISCONTINUED | OUTPATIENT
Start: 2023-03-17 | End: 2023-03-18 | Stop reason: HOSPADM

## 2023-03-17 RX ORDER — HYDROXYZINE HYDROCHLORIDE 25 MG/1
25 TABLET, FILM COATED ORAL EVERY 6 HOURS PRN
Status: DISCONTINUED | OUTPATIENT
Start: 2023-03-17 | End: 2023-03-18 | Stop reason: HOSPADM

## 2023-03-17 RX ORDER — ACETAMINOPHEN 325 MG/1
650 TABLET ORAL EVERY 4 HOURS PRN
Status: DISCONTINUED | OUTPATIENT
Start: 2023-03-20 | End: 2023-03-18 | Stop reason: HOSPADM

## 2023-03-17 RX ORDER — OXYCODONE HYDROCHLORIDE 5 MG/1
5 TABLET ORAL EVERY 6 HOURS PRN
Qty: 26 TABLET | Refills: 0 | Status: SHIPPED | OUTPATIENT
Start: 2023-03-17 | End: 2023-03-20

## 2023-03-17 RX ORDER — BUPIVACAINE HYDROCHLORIDE 5 MG/ML
INJECTION, SOLUTION EPIDURAL; INTRACAUDAL
Status: COMPLETED | OUTPATIENT
Start: 2023-03-17 | End: 2023-03-17

## 2023-03-17 RX ADMIN — Medication 3 G: at 09:50

## 2023-03-17 RX ADMIN — MIDAZOLAM HYDROCHLORIDE 2 MG: 1 INJECTION, SOLUTION INTRAMUSCULAR; INTRAVENOUS at 09:03

## 2023-03-17 RX ADMIN — PHENYLEPHRINE HYDROCHLORIDE 50 MCG: 10 INJECTION INTRAVENOUS at 10:57

## 2023-03-17 RX ADMIN — ACETAMINOPHEN 975 MG: 325 TABLET ORAL at 16:42

## 2023-03-17 RX ADMIN — DEXAMETHASONE SODIUM PHOSPHATE 10 MG: 4 INJECTION, SOLUTION INTRA-ARTICULAR; INTRALESIONAL; INTRAMUSCULAR; INTRAVENOUS; SOFT TISSUE at 10:35

## 2023-03-17 RX ADMIN — SENNOSIDES AND DOCUSATE SODIUM 1 TABLET: 50; 8.6 TABLET ORAL at 20:39

## 2023-03-17 RX ADMIN — BUPIVACAINE HYDROCHLORIDE IN DEXTROSE 1.6 ML: 7.5 INJECTION, SOLUTION SUBARACHNOID at 09:54

## 2023-03-17 RX ADMIN — TRANEXAMIC ACID 1950 MG: 650 TABLET ORAL at 08:31

## 2023-03-17 RX ADMIN — SODIUM CHLORIDE, POTASSIUM CHLORIDE, SODIUM LACTATE AND CALCIUM CHLORIDE: 600; 310; 30; 20 INJECTION, SOLUTION INTRAVENOUS at 08:40

## 2023-03-17 RX ADMIN — SODIUM CHLORIDE, POTASSIUM CHLORIDE, SODIUM LACTATE AND CALCIUM CHLORIDE: 600; 310; 30; 20 INJECTION, SOLUTION INTRAVENOUS at 11:11

## 2023-03-17 RX ADMIN — ONDANSETRON 4 MG: 2 INJECTION INTRAMUSCULAR; INTRAVENOUS at 09:52

## 2023-03-17 RX ADMIN — RIVAROXABAN 10 MG: 10 TABLET, FILM COATED ORAL at 20:39

## 2023-03-17 RX ADMIN — PROPOFOL 40 MG: 10 INJECTION, EMULSION INTRAVENOUS at 09:55

## 2023-03-17 RX ADMIN — FENTANYL CITRATE 100 MCG: 50 INJECTION, SOLUTION INTRAMUSCULAR; INTRAVENOUS at 09:03

## 2023-03-17 RX ADMIN — PROPOFOL 40 MG: 10 INJECTION, EMULSION INTRAVENOUS at 10:02

## 2023-03-17 RX ADMIN — CEFAZOLIN SODIUM 2 G: 2 INJECTION, SOLUTION INTRAVENOUS at 17:34

## 2023-03-17 RX ADMIN — SODIUM CHLORIDE, POTASSIUM CHLORIDE, SODIUM LACTATE AND CALCIUM CHLORIDE: 600; 310; 30; 20 INJECTION, SOLUTION INTRAVENOUS at 14:11

## 2023-03-17 RX ADMIN — ACETAMINOPHEN 975 MG: 325 TABLET ORAL at 08:31

## 2023-03-17 RX ADMIN — BUPIVACAINE HYDROCHLORIDE 20 ML: 5 INJECTION, SOLUTION EPIDURAL; INTRACAUDAL; PERINEURAL at 09:08

## 2023-03-17 RX ADMIN — KETOROLAC TROMETHAMINE 15 MG: 15 INJECTION, SOLUTION INTRAMUSCULAR; INTRAVENOUS at 16:42

## 2023-03-17 RX ADMIN — PROPOFOL 50 MCG/KG/MIN: 10 INJECTION, EMULSION INTRAVENOUS at 10:02

## 2023-03-17 RX ADMIN — LIDOCAINE HYDROCHLORIDE 20 MG: 20 INJECTION, SOLUTION INFILTRATION; PERINEURAL at 09:52

## 2023-03-17 RX ADMIN — PHENYLEPHRINE HYDROCHLORIDE 50 MCG: 10 INJECTION INTRAVENOUS at 10:58

## 2023-03-17 ASSESSMENT — ACTIVITIES OF DAILY LIVING (ADL)
ADLS_ACUITY_SCORE: 22
ADLS_ACUITY_SCORE: 21
ADLS_ACUITY_SCORE: 21
ADLS_ACUITY_SCORE: 22
ADLS_ACUITY_SCORE: 22
ADLS_ACUITY_SCORE: 21
ADLS_ACUITY_SCORE: 22
ADLS_ACUITY_SCORE: 21

## 2023-03-17 ASSESSMENT — ENCOUNTER SYMPTOMS: SEIZURES: 1

## 2023-03-17 NOTE — OP NOTE
Operative Report    PATIENT Crystal Galvan   DATE OF SURGERY:  3/17/2023    PREOPERATIVE DIAGNOSIS   Osteoarthritis of left knee [M17.12].    POSTOPERATIVE DIAGNOSIS   Osteoarthritis of left knee [M17.12].    PROCEDURE PERFORMED   LEFT total knee arthroplasty    IMPLANTS  Implant Name Type Inv. Item Serial No.  Lot No. LRB No. Used Action   IMP COMP FEM S&N GEN II REV POR CTD ZIRC SZ 5 LT [id number] - [id number] Total Joint Component/Insert IMP COMP FEM S&N GEN II REV POR CTD ZIRC SZ 5 LT [id number]  [employer] [id number] Left 1 Implanted   IMP BASEPLATE TIBIAL GAY II SZ 4 LT TI [id number] - [id number] Total Joint Component/Insert IMP BASEPLATE TIBIAL GAY II SZ 4 LT TI [id number]  [employer] [id number] Left 1 Implanted   IMP INSERT TIBIAL S&N LEGION XLPE DISHED SZ3-4 9MM [id number] - [id number] Total Joint Component/Insert IMP INSERT TIBIAL S&N LEGION XLPE DISHED SZ3-4 9MM [id number]  [employer] [id number] Left 1 Implanted   IMP COMP PATELLA S&N UHMWPE 29X8.5MM OVAL [id number] - [id number] Total Joint Component/Insert IMP COMP PATELLA S&N UHMWPE 29X8.5MM OVAL [id number]  [employer] [id number] Left 1 Implanted       SURGEON  Shashank Pro MD     ASSISTANT   Suad Solis PA-C; assistant was required for patient positioning, surgical assistance, wound closure and monitoring patient's safety throughout the case.    ANESTHESIA  Spinal with Block      FINDINGS:  Full thickness, tricompartmental OA.     SPECIMENS:  none    ESTIMATED BLOOD LOSS:  50cc    COMPLICATIONS   None.        INDICATION FOR PROCEDURE  Crystal Galvan is a 38 year old female who I evaluated in my clinic for severe LEFT knee pain.  X-rays confirmed end-stage osteoarthritis.  All conservative treatments were exhausted including: Activity modification, NSAIDs and intra-articular injections. These no longer provided symptom relief.  The patient's quality of life and activities of daily living were being greatly  affected.  Due to this, the patient was indicated for total knee arthroplasty.      PREOPERATIVE EXAMINATION:   No significant flexion contracture.     INFORMED CONSENT  Crystal Galvan was identified in the preoperative holding area and was identified using medical record number, name, and date of birth, all of which were confirmed. The operative extremity was marked using an indelible marker. Once again, the risks, benefits and expected outcomes of the procedure were discussed in full.  This discussion included, but was not limited to: Bleeding, nerve/vascular injury, fracture, instability, implant complications, continued pain, stiffness, scarring/incision numbness, infection, anesthetic/medical complications, death.  After this discussion patient elected to proceed with procedure, and did sign informed consent.    DESCRIPTION OF PROCEDURE   Crystal Galvan received a regional block in the preoperative holding area.  They were then brought back to the operating room and placed on the operating table in the supine position.  All bony prominences were well-padded.  A well-padded tourniquet was placed high on the operative thigh. The operative leg was then sterilely prepped and draped in the usual fashion with ChloraPrep.     A timeout was performed prior to the start of the procedure.  This confirmed the patient's name, correct site and side of surgery, and the procedure being performed.  Allergies were also confirmed.  All necessary implants were confirmed and available.  Administration of IV antibiotics and TXA were confirmed. Three independent staff members agreed with the information discussed in the timeout.     The leg was exsanguinated and the tourniquet was inflated.  A midline approach to the knee was utilized.  Sharp dissection was performed down to the level of the capsule. Medial and lateral skin flaps were raised.  A medial parapatellar arthrotomy was performed. The anterior horn of the  medial meniscus was resected and a medial release was performed around the tibial plateau. Medial osteophytes were resected.  The knee was extended and patellar fat pad was excised being careful to protect the patellar tendon.      Attention was turned back to the femur.  The ACL was sacrificed. This the opening drill was utilized to open the femoral canal.  The flexible intramedullary guide was then placed.  The distal femoral cutting jig was pinned into place in 5 degrees of valgus with a depth of 8 mm.  Intramedullary guide was then removed and distal femoral cut was then made.  This was ensured to be flat.  The pins were left in place and attention was turned to the tibia.     With the knee flexed and retractors placed to protect ligaments and vascular structures, the extramedullary tibial guide was placed.   The slope was checked and the distal aspect was centered on the ankle.  The jig was pinned, and depth of resection was verified.  The proximal tibial resection was performed using an oscillating saw being careful to protect the bone block, PCL and posterior structures.  The jig was removed with the pins were left in place.  At this point, the knee was brought down to full extension and a 9 mm spacer block was placed.  Checked the medial and lateral gaps which were found to be acceptable.  The 2 pins in the tibia and the 2 pins in the femur were then removed.     Attention was turned back to the distal femur.  The knee was flexed to 90 degrees.  The rotation of the flexion gap was checked against both Whitesides as well as the TEA and found to be 3 degrees.  4-in-1 cutting guide was then set to this amount of external rotation.  This was then applied to the distal femur and the femur was appropriately sized.  The  holes were drilled.  The 4-in-1 cutting guide was then pinned into place.  The posterior cut was made first and again a 9 mm spacer block was utilized to ensure that the flexion space would  accept a 9 mm poly.  Once this was done the remainder of the cuts were made.    A laminar  was placed in the lateral side.  The medial meniscus was removed.  The posterior medial osteophytes were removed.  The shoulder was then moved to the medial side and the lateral meniscus was removed.  The posterior lateral osteophytes were also removed.     At this point, we moved to trialing.  A femoral trial was placed.  A tibial baseplate with a 9 mm poly preloaded onto it were then placed onto the tibia and the knee was brought down into extension.  Full extension was obtained.  The medial-lateral balance was acceptable.  Flexion to 130 degrees was obtained.  The medial and lateral balance at 90 degrees of flexion was also found to be acceptable. Drawer exam was also found to be acceptable.     We then everted the patella and placed towel clamps.  This was cut with a freehand technique.   The patella was sized with a caliper, and an oscillating saw was used to make a flush cut.  The size of the patella was determined, and the lugs holes were drilled.  A trial patellar component was then placed onto the patella the patella was reduced and the knee was flexed up ensuring adequate patellar tracking. The lugs were drilled for the femur.      The trial components were then removed.  The proximal tibia was exposed with retractors in place.  The tibial component was appropriately sized and set to the appropriate rotation and then pinned into place.  The tibia was then punched.  The baseplate was then removed.  Local injection cocktail was placed in the soft tissues surrounding the knee.  The bony surfaces were then thoroughly irrigated and dried with a sponge in preparation for cementing.     The cement was mixed and the components were sequentially cemented.  The tibia was impacted down into place and excess cement was removed.  The final poly was placed into the tibial baseplate and this was reduced back under the  femur.  Cement was applied to the distal femur.  Femoral component was then impacted into place and cement was removed.  The knee was brought down into full extension.  Cement was applied to the patella and the patella was gently placed and the excess cement was again removed.  At this point, the cement was allowed to fully cure.    Again the balance both in flexion and extension was found to be adequate.  Full extension was obtained.  130 degrees of flexion was obtained.     The wound was closed in layers with a #2 vicryl and #1 Stratafix for the capsular closure, 2-0 vicryl, and 3-0 monocryl, followed by skin glue. The aquacel dressing was applied, followed by a full leg ACE wrap.      The patient was then transferred to the postoperative bed, awoken from anesthesia and taken to recovery in stable condition.  There were no complications.  The patient tolerated the procedure well.    POSTOPERATIVE EXAM:  Palpable dorsalis pedis pulse at the end of the case.    POSTOPERATIVE PLAN:  -Pain control  -PT/OT, WBAT  -24 hours antibiotics  -ASA for DVT ppx  -X-rays to be completed in PACU    Shashank Pro MD  [facility]

## 2023-03-17 NOTE — PLAN OF CARE

## 2023-03-17 NOTE — ANESTHESIA PREPROCEDURE EVALUATION
Anesthesia Pre-Procedure Evaluation    Patient: Crystal Galvan   MRN: [id number] : 1985        Procedure : Procedure(s):  LEFT TOTAL KNEE ARTHROPLASTY          Past Medical History:   Diagnosis Date     Anxiety      Arthritis      Hyperlipidemia      Motion sickness      Obese      OCD (obsessive compulsive disorder)      PONV (postoperative nausea and vomiting)      PTSD (post-traumatic stress disorder)      Seizures (H)     none since childhood     Sleep apnea       Past Surgical History:   Procedure Laterality Date     ORTHOPEDIC SURGERY Left     arthroscopy      Allergies   Allergen Reactions     Nickel      Severe reaction with rejection, Dr. Pro aware for TJA.      Aspirin Nausea and Vomiting     dizzy     Latex Rash     Added based on information entered during case entry, please review and add reactions, type, and severity as needed. Mild reaction, per patient      Social History     Tobacco Use     Smoking status: Never     Smokeless tobacco: Never   Substance Use Topics     Alcohol use: Not Currently      Wt Readings from Last 1 Encounters:   23 128.7 kg (283 lb 11.2 oz)        Anesthesia Evaluation   Pt has had prior anesthetic.     History of anesthetic complications  - PONV.      ROS/MED HX  ENT/Pulmonary:     (+) sleep apnea,     Neurologic:     (+) seizures,     Cardiovascular:     (+) Dyslipidemia -----    METS/Exercise Tolerance:     Hematologic:  - neg hematologic  ROS     Musculoskeletal:   (+) arthritis,     GI/Hepatic:  - neg GI/hepatic ROS     Renal/Genitourinary:  - neg Renal ROS     Endo:     (+) Obesity (BMI > 40),     Psychiatric/Substance Use:     (+) psychiatric history anxiety and other (comment) (OCD)     Infectious Disease:       Malignancy:       Other:            Physical Exam    Airway  airway exam normal      Mallampati: III   TM distance: > 3 FB   Neck ROM: full   Mouth opening: > 3 cm    Respiratory Devices and Support         Dental       (+) Minor  Abnormalities - some fillings, tiny chips      Cardiovascular   cardiovascular exam normal          Pulmonary   pulmonary exam normal                OUTSIDE LABS:  CBC:   Lab Results   Component Value Date    WBC 6.6 02/27/2023    WBC 5.9 10/01/2019    HGB 14.2 02/27/2023    HGB 14.0 10/01/2019    HCT 42.6 02/27/2023    HCT 41.8 10/01/2019     02/27/2023     10/01/2019     BMP:   Lab Results   Component Value Date     02/27/2023     07/11/2022    POTASSIUM 4.1 02/27/2023    POTASSIUM 4.3 07/11/2022    CHLORIDE 102 02/27/2023    CHLORIDE 101 07/11/2022    CO2 23 02/27/2023    CO2 28 07/11/2022    BUN 11.3 02/27/2023    BUN 13.5 07/11/2022    CR 0.75 02/27/2023    CR 0.78 07/11/2022    GLC 98 02/27/2023    GLC 88 07/11/2022     COAGS: No results found for: PTT, INR, FIBR  POC: No results found for: BGM, HCG, HCGS  HEPATIC:   Lab Results   Component Value Date    ALBUMIN 4.4 02/27/2023    PROTTOTAL 7.2 02/27/2023    ALT 55 (H) 02/27/2023    AST 38 (H) 02/27/2023    ALKPHOS 95 02/27/2023    BILITOTAL 0.4 02/27/2023     OTHER:   Lab Results   Component Value Date    ADNRAE 9.5 02/27/2023    TSH 1.37 07/11/2022       Anesthesia Plan    ASA Status:  3   NPO Status:  NPO Appropriate    Anesthesia Type: Spinal.   Induction: Intravenous.   Maintenance: Balanced.        Consents    Anesthesia Plan(s) and associated risks, benefits, and realistic alternatives discussed. Questions answered and patient/representative(s) expressed understanding.    - Discussed:     - Discussed with:  Patient, Parent (Mother and/or Father)      - Extended Intubation/Ventilatory Support Discussed: No.      - Patient is DNR/DNI Status: No         Postoperative Care    Pain management: Multi-modal analgesia, Peripheral nerve block (Single Shot).   PONV prophylaxis: Ondansetron (or other 5HT-3), Dexamethasone or Solumedrol     Comments:    Other Comments: Preop nerve block for postop pain control  Spinal anesthesia  1  PIV  Fentanyl and dilaudid if needed  Decadron, huber Mello MD

## 2023-03-17 NOTE — ANESTHESIA PROCEDURE NOTES
Adductor canal Procedure Note    Pre-Procedure   Staff -        Anesthesiologist:  Karolina Mello MD       Performed By: anesthesiologist       Location: pre-op       Procedure Start/Stop Times: 3/17/2023 9:08 AM and 3/17/2023 9:11 AM       Pre-Anesthestic Checklist: patient identified, IV checked, site marked, risks and benefits discussed, informed consent, monitors and equipment checked, pre-op evaluation, at physician/surgeon's request and post-op pain management  Timeout:       Correct Patient: Yes        Correct Procedure: Yes        Correct Site: Yes        Correct Position: Yes        Correct Laterality: Yes        Site Marked: Yes  Procedure Documentation  Procedure: Adductor canal       Laterality: left       Patient Position: supine       Patient Prep/Sterile Barriers: sterile gloves, mask       Skin prep: Chloraprep       Needle Gauge: 20.        Needle Length (Inches): 4        Ultrasound guided       1. Ultrasound was used to identify targeted nerve, plexus, vascular marker, or fascial plane and place a needle adjacent to it in real-time.       2. Ultrasound was used to visualize the spread of anesthetic in close proximity to the above referenced structure.       3. A permanent image is entered into the patient's record.       4. The visualized anatomic structures appeared normal.       5. There were no apparent abnormal pathologic findings.    Assessment/Narrative         The placement was negative for: blood aspirated, painful injection and site bleeding       Paresthesias: No.       Bolus given via needle..        Secured via.        Insertion/Infusion Method: Single Shot       Complications: none       Injection made incrementally with aspirations every 5 mL.    Medication(s) Administered   Bupivacaine 0.5% PF (Infiltration) - Infiltration   20 mL - 3/17/2023 9:08:00 AM  Medication Administration Time: 3/17/2023 9:08 AM      FOR [facility] ([facility]) ONLY:   Pain Team Contact information: please  "page the Pain Team Via [facility]. Search \"Pain\". During daytime hours, please page the attending first. At night please page the resident first.    "

## 2023-03-17 NOTE — INTERVAL H&P NOTE
"I have reviewed the surgical (or preoperative) H&P that is linked to this encounter, and examined the patient. There are no significant changes    Clinical Conditions Present on Arrival:  Clinically Significant Risk Factors Present on Admission                    # Severe Obesity: Estimated body mass index is 43.14 kg/m  as calculated from the following:    Height as of this encounter: 1.727 m (5' 8\").    Weight as of this encounter: 128.7 kg (283 lb 11.2 oz).       "

## 2023-03-17 NOTE — PROGRESS NOTES
03/17/23 1550   Appointment Info   Signing Clinician's Name / Credentials (PT) Shelbie Gordon, PT, DPT   Quick Adds   Quick Adds Certification   Living Environment   People in Home child(maria guadalupe), dependent;parent(s)  (Mother and son)   Current Living Arrangements house   Home Accessibility stairs to enter home;stairs within home   Number of Stairs, Main Entrance 1   Number of Stairs, Within Home, Primary greater than 10 stairs  (4 level home)   Stair Railings, Within Home, Primary railings safe and in good condition   Self-Care   Equipment Currently Used at Home none   Activity/Exercise/Self-Care Comment Has FWW   General Information   Onset of Illness/Injury or Date of Surgery 03/17/23   Referring Physician Dr. Shashank Pro   Patient/Family Therapy Goals Statement (PT) Be able to go for walks again   Pertinent History of Current Problem (include personal factors and/or comorbidities that impact the POC) S/P L TKA   Weight-Bearing Status - LLE weight-bearing as tolerated   Bed Mobility   Bed Mobility supine-sit   Supine-Sit [place] (Bed Mobility) supervision;verbal cues   Transfers   Transfers sit-stand transfer   Maintains Weight-bearing Status (Transfers) able to maintain   Sit-Stand Transfer   Sit-Stand [place] (Transfers) contact guard;verbal cues   Assistive Device (Sit-Stand Transfers) walker, front-wheeled   Gait/Stairs (Locomotion)   [place] Level (Gait) contact guard;verbal cues   Assistive Device (Gait) walker, front-wheeled   Distance in Feet 10   Distance in Feet (Gait) 150, 10x2   Pattern (Gait) step-to   Deviations/Abnormal Patterns (Gait) antalgic;dian decreased;gait speed decreased   Maintains Weight-bearing Status (Gait) able to maintain   Clinical Impression   Criteria for Skilled Therapeutic Intervention Yes, treatment indicated   PT Diagnosis (PT) Impaired functional mobility   Influenced by the following impairments weakness, pain, decreased ROM   Functional limitations due  to impairments transfers, gait, stairs   Clinical Presentation (PT Evaluation Complexity) Stable/Uncomplicated   Clinical Presentation Rationale Pt presents as medically diagnosed   Clinical Decision Making (Complexity) low complexity   Planned Therapy Interventions (PT) gait training;home exercise program;patient/family education;transfer training;stair training   Anticipated Equipment Needs at Discharge (PT) walker, rolling   Risk & Benefits of therapy have been explained evaluation/treatment results reviewed;care plan/treatment goals reviewed;patient   PT Total Evaluation Time   PT Eval, Low Complexity Minutes (90701) 10   Therapy Certification   Start of care date 03/17/23   Certification date from 03/17/23   Certification date to 04/14/23   Medical Diagnosis S/P L TKA   Physical Therapy Goals   PT Frequency Daily   PT Predicted Duration/Target Date for Goal Attainment 03/18/23   PT Goals Gait;Stairs;PT Goal 1   PT: Gait Rolling walker;Modified independent;100 feet   PT: Stairs Supervision/stand-by assist;8 stairs;Rail on both sides   PT: Goal 1 Independent TK HEP   Interventions   Interventions Quick Adds Gait Training;Therapeutic Activity;Therapeutic Procedure   Therapeutic Procedure/Exercise   Ther. Procedure: strength, endurance, ROM, flexibillity Minutes (85031) 10   Treatment Detail/Skilled Intervention TK exercises x 10 reps with L LE, SBA to CGA, verbal cueing for exercise technique   Therapeutic Activity   Symptoms Noted During/After Treatment None   Treatment Detail/Skilled Intervention Supine <>sit with HOB elevated, supervision. Sit<>Stand with FWW x 2 reps with CGA, verbal cueing for device advancement when turning to sit, safety, hand placement. Toilet transfer with FWW, CGA, verbal cueing for turning by toilet, device advancement.   Gait Training   Gait Training Minutes (28082) 15   Symptoms Noted During/After Treatment (Gait Training) increased pain   Treatment Detail/Skilled Intervention verbal  cueing for posture, safety, LE advancement. Education on post-op ambulation at home and icing   [place] Level (Gait Training) contact guard   Physical Assistance Level (Gait Training) verbal cues   Weight Bearing (Gait Training) weight-bearing as tolerated   Assistive Device (Gait Training) rolling walker   Pattern Analysis (Gait Training) swing-through gait   Gait Analysis Deviations decreased dian;decreased step length  (flexed posture)   Impairments (Gait Analysis/Training) pain   PT Discharge Planning   PT Plan Gait, stairs, TK exercises   PT Discharge Recommendation (DC Rec) (S)  home with outpatient physical therapy   PT Rationale for DC Rec Patient ambulating safely with FWW, support from family at home   PT Brief overview of current status Amb 150ft with FWW, CGA   Total Session Time   Timed Code Treatment Minutes 25   Total Session Time (sum of timed and untimed services) 35   [facility]  OUTPATIENT PHYSICAL THERAPY EVALUATION  PLAN OF TREATMENT FOR OUTPATIENT REHABILITATION  (COMPLETE FOR INITIAL CLAIMS ONLY)  Patient's Last Name, First Name, M.I.  YOB: 1985  Crystal Galvan                        Provider's Name  [facility] Medical Record No.  [id number]                             Onset Date:  03/17/23   Start of Care Date:  03/17/23   Type:     _X_PT   ___OT   ___SLP Medical Diagnosis:  S/P L TKA              PT Diagnosis:  Impaired functional mobility Visits from SOC:  1     See note for plan of treatment, functional goals and certification details    I CERTIFY THE NEED FOR THESE SERVICES FURNISHED UNDER        THIS PLAN OF TREATMENT AND WHILE UNDER MY CARE     (Physician co-signature of this document indicates review and certification of the therapy plan).

## 2023-03-17 NOTE — CONSULTS
"[facility]  Consult Note - Hospitalist Service  Date of Admission:  3/17/2023  Consult Requested by: Orthopedic surgery  Reason for Consult: Medical management of chronic medical conditions.    Assessment & Plan   Crystal Galvan is a 38 year old female admitted on 3/17/2023.  She has a past medical history significant for anxiety, PTSD and epilepsy who underwent elective left total knee arthroplasty on 3/17/2023.  Hospitalist medicine consulted for management of chronic medical conditions.      #Anxiety, PTSD  -No signs of anxiety attack.  -Received her home Lexapro prior to her surgery.    Plan:  [] Continue Lexapro 50 mg daily.  [] Continue home Xanax 0.5 mg as needed  [] Continue home doxepin 2 mg nightly as needed.      #Dyslipidemia    Plan:  [] Resume home atorvastatin tomorrow.         Clinically Significant Risk Factors Present on Admission                       # Severe Obesity: Estimated body mass index is 44.01 kg/m  as calculated from the following:    Height as of this encounter: 1.727 m (5' 8\").    Weight as of this encounter: 131.3 kg (289 lb 7.4 oz).           ALEM ARZATE MD  Hospitalist Service  Securely message with [employer] (more info)  Text page via [facility] Paging/Directory   ______________________________________________________________________      Past Medical History    Past Medical History:   Diagnosis Date     Anxiety      Arthritis      Hyperlipidemia      Motion sickness      Obese      OCD (obsessive compulsive disorder)      PONV (postoperative nausea and vomiting)      PTSD (post-traumatic stress disorder)      Seizures (H)     none since childhood     Sleep apnea        Past Surgical History   Past Surgical History:   Procedure Laterality Date     ORTHOPEDIC SURGERY Left     arthroscopy       Medications   I have reviewed this patient's current medications       Physical Exam   Vital Signs: Temp: 98.7  F (37.1  C) Temp src: Oral BP: 125/69 Pulse: 93   " "Resp: 16 SpO2: 93 % O2 Device: None (Room air) Oxygen Delivery: 4 LPM  Weight: 289 lbs 7.42 oz    General: Laying comfortably in bed in no acute distress  Heart: Regular rate and rhythm without any murmurs.  Extremities: Left lower extremity wrapped with an Ace wrap.      Medical Decision Making       40 MINUTES SPENT BY ME on the date of service doing chart review, history, exam, documentation & further activities per the note.      Data   Imaging results reviewed over the past 24 hrs:   Recent Results (from the past 24 hour(s))   POC US Guidance Needle Placement    Narrative    Ultrasound was performed as guidance to an anesthesia procedure.  Click   \"PACS images\" hyperlink below to view any stored images.  For specific   procedure details, view procedure note authored by anesthesia.   XR Knee Port Left 1/2 Views    Narrative    EXAM: XR KNEE PORT LEFT 1/2 VIEWS  LOCATION: [facility]  DATE/TIME: 3/17/2023 12:34 PM    INDICATION: Postop total knee.  COMPARISON: None.      Impression    IMPRESSION: Postoperative changes left total knee arthroplasty and patellar resurfacing. Post procedural air within the joint and surrounding soft tissues. Components appear well seated.               No lab results found in last 7 days.    "

## 2023-03-17 NOTE — ANESTHESIA POSTPROCEDURE EVALUATION
Patient: Crystal Galvan    Procedure: Procedure(s):  LEFT TOTAL KNEE ARTHROPLASTY       Anesthesia Type:  Spinal    Note:  Disposition: Outpatient   Postop Pain Control: Uneventful            Sign Out: Well controlled pain   PONV: No   Neuro/Psych: Uneventful            Sign Out: Acceptable/Baseline neuro status   Airway/Respiratory: Uneventful            Sign Out: Acceptable/Baseline resp. status   CV/Hemodynamics: Uneventful            Sign Out: Acceptable CV status; No obvious hypovolemia; No obvious fluid overload   Other NRE: NONE   DID A NON-ROUTINE EVENT OCCUR? No           Last vitals:  Vitals Value Taken Time   /76 03/17/23 1300   Temp 36.2  C (97.2  F) 03/17/23 1150   Pulse 76 03/17/23 1301   Resp 16 03/17/23 1240   SpO2 95 % 03/17/23 1301   Vitals shown include unvalidated device data.    Electronically Signed By: Lorraine Rajan MD  March 17, 2023  1:02 PM

## 2023-03-17 NOTE — PHARMACY-ADMISSION MEDICATION HISTORY
Pharmacy Note - Admission Medication History    Pertinent Provider Information: N/A   ______________________________________________________________________    Prior To Admission (PTA) med list completed and updated in EMR.       PTA Med List   Medication Sig Last Dose     ALPRAZolam (XANAX) 0.5 MG tablet Take 0.5 mg by mouth daily as needed for anxiety 3/15/2023 at PM - takes about 2x/week     atorvastatin (LIPITOR) 40 MG tablet Take 40 mg by mouth every evening 3/16/2023 at PM     doxepin (SINEQUAN) 10 MG/ML (HIGH CONC) solution Take 2 mg by mouth nightly as needed for sleep More than a month at takes about once every 2 months     escitalopram (LEXAPRO) 10 MG tablet Take 15 mg by mouth daily 3/17/2023 at AM     fish oil-omega-3 fatty acids 1000 MG capsule Take 1 g by mouth daily 3/14/2023 at on hold for surgery     meclizine (ANTIVERT) 25 MG tablet Take 25 mg by mouth daily as needed (car/motion sickness) Past Week at only needs for car sickness     menthol (ICY HOT) 5 % PTCH Apply 1 patch topically every 8 hours as needed (knee pain) Past Month at PRN     multivitamin w/minerals (THERA-VIT-M) tablet Take 1 tablet by mouth daily 3/14/2023 at on hold for surgery     Turmeric 500 MG CAPS Take 1 capsule by mouth every evening Past Week at on hold for surgery     Vitamin D3 (CHOLECALCIFEROL) 25 mcg (1000 units) tablet Take 1 tablet by mouth daily Past Week at on hold for surgery       Information source(s): Patient, Clinic records and [facility]    Method of interview communication: in-person    Patient was asked about OTC/herbal products specifically.  PTA med list reflects this.    Based on the pharmacist's assessment, the PTA med list information appears reliable    Medication Affordability: not assessed       Allergies were reviewed, assessed, and updated with the patient.      Patient does not use any multi-dose medications prior to admission.     Thank you for the opportunity to participate in  the care of this patient.      Elenita Starkey [facility]     3/17/2023     8:43 AM

## 2023-03-17 NOTE — ANESTHESIA CARE TRANSFER NOTE
Patient: Crystal Galvan    Procedure: Procedure(s):  LEFT TOTAL KNEE ARTHROPLASTY       Diagnosis: Osteoarthritis of left knee [M17.12]  Diagnosis Additional Information: No value filed.    Anesthesia Type:   Spinal     Note:    Oropharynx: oropharynx clear of all foreign objects  Level of Consciousness: awake  Oxygen Supplementation: nasal cannula  Level of Supplemental Oxygen (L/min / FiO2): 3  Independent Airway: airway patency satisfactory and stable  Dentition: dentition unchanged  Vital Signs Stable: post-procedure vital signs reviewed and stable  Report to RN Given: handoff report given  Patient transferred to: PACU    Handoff Report: Identifed the Patient, Identified the Reponsible Provider, Reviewed the pertinent medical history, Discussed the surgical course, Reviewed Intra-OP anesthesia mangement and issues during anesthesia, Set expectations for post-procedure period and Allowed opportunity for questions and acknowledgement of understanding      Vitals:  Vitals Value Taken Time   /53 03/17/23 1145   Temp 97.2f    Pulse 76 03/17/23 1148   Resp 17 03/17/23 1148   SpO2 100 % 03/17/23 1148   Vitals shown include unvalidated device data.    Electronically Signed By: YOVANI Salgado CRNA  March 17, 2023  11:49 AM

## 2023-03-18 ENCOUNTER — APPOINTMENT (OUTPATIENT)
Dept: PHYSICAL THERAPY | Facility: CLINIC | Age: 38
End: 2023-03-18
Attending: ORTHOPAEDIC SURGERY
Payer: COMMERCIAL

## 2023-03-18 ENCOUNTER — APPOINTMENT (OUTPATIENT)
Dept: OCCUPATIONAL THERAPY | Facility: CLINIC | Age: 38
End: 2023-03-18
Attending: ORTHOPAEDIC SURGERY
Payer: COMMERCIAL

## 2023-03-18 VITALS
HEART RATE: 87 BPM | TEMPERATURE: 98.5 F | HEIGHT: 68 IN | RESPIRATION RATE: 16 BRPM | WEIGHT: 289.46 LBS | BODY MASS INDEX: 43.87 KG/M2 | OXYGEN SATURATION: 97 % | SYSTOLIC BLOOD PRESSURE: 118 MMHG | DIASTOLIC BLOOD PRESSURE: 58 MMHG

## 2023-03-18 LAB
CREAT SERPL-MCNC: 0.74 MG/DL (ref 0.6–1.1)
FASTING STATUS PATIENT QL REPORTED: NO
GFR SERPL CREATININE-BSD FRML MDRD: >90 ML/MIN/1.73M2
GLUCOSE BLD-MCNC: 168 MG/DL (ref 70–125)
HGB BLD-MCNC: 12.2 G/DL (ref 11.7–15.7)

## 2023-03-18 PROCEDURE — 36415 COLL VENOUS BLD VENIPUNCTURE: CPT | Performed by: ORTHOPAEDIC SURGERY

## 2023-03-18 PROCEDURE — 97110 THERAPEUTIC EXERCISES: CPT | Mod: GP

## 2023-03-18 PROCEDURE — 250N000011 HC RX IP 250 OP 636: Performed by: ORTHOPAEDIC SURGERY

## 2023-03-18 PROCEDURE — 97535 SELF CARE MNGMENT TRAINING: CPT | Mod: GO

## 2023-03-18 PROCEDURE — 99214 OFFICE O/P EST MOD 30 MIN: CPT | Performed by: STUDENT IN AN ORGANIZED HEALTH CARE EDUCATION/TRAINING PROGRAM

## 2023-03-18 PROCEDURE — 82947 ASSAY GLUCOSE BLOOD QUANT: CPT | Performed by: ORTHOPAEDIC SURGERY

## 2023-03-18 PROCEDURE — 82565 ASSAY OF CREATININE: CPT | Performed by: ORTHOPAEDIC SURGERY

## 2023-03-18 PROCEDURE — 250N000013 HC RX MED GY IP 250 OP 250 PS 637: Performed by: ORTHOPAEDIC SURGERY

## 2023-03-18 PROCEDURE — 250N000013 HC RX MED GY IP 250 OP 250 PS 637: Performed by: STUDENT IN AN ORGANIZED HEALTH CARE EDUCATION/TRAINING PROGRAM

## 2023-03-18 PROCEDURE — 97166 OT EVAL MOD COMPLEX 45 MIN: CPT | Mod: GO

## 2023-03-18 PROCEDURE — 85018 HEMOGLOBIN: CPT | Performed by: ORTHOPAEDIC SURGERY

## 2023-03-18 PROCEDURE — 97116 GAIT TRAINING THERAPY: CPT | Mod: GP

## 2023-03-18 RX ORDER — AMOXICILLIN 250 MG
1 CAPSULE ORAL 2 TIMES DAILY
COMMUNITY
Start: 2023-03-18

## 2023-03-18 RX ORDER — HYDROXYZINE HYDROCHLORIDE 25 MG/1
25 TABLET, FILM COATED ORAL EVERY 6 HOURS PRN
Qty: 30 TABLET | Refills: 0 | Status: SHIPPED | OUTPATIENT
Start: 2023-03-18

## 2023-03-18 RX ORDER — ACETAMINOPHEN 325 MG/1
975 TABLET ORAL EVERY 8 HOURS
COMMUNITY
Start: 2023-03-18

## 2023-03-18 RX ADMIN — ESCITALOPRAM OXALATE 15 MG: 5 TABLET, FILM COATED ORAL at 08:00

## 2023-03-18 RX ADMIN — ACETAMINOPHEN 975 MG: 325 TABLET ORAL at 00:10

## 2023-03-18 RX ADMIN — KETOROLAC TROMETHAMINE 15 MG: 15 INJECTION, SOLUTION INTRAMUSCULAR; INTRAVENOUS at 06:48

## 2023-03-18 RX ADMIN — CEFAZOLIN SODIUM 2 G: 2 INJECTION, SOLUTION INTRAVENOUS at 01:47

## 2023-03-18 RX ADMIN — ACETAMINOPHEN 975 MG: 325 TABLET ORAL at 07:59

## 2023-03-18 RX ADMIN — SENNOSIDES AND DOCUSATE SODIUM 1 TABLET: 50; 8.6 TABLET ORAL at 08:00

## 2023-03-18 RX ADMIN — POLYETHYLENE GLYCOL 3350 17 G: 17 POWDER, FOR SOLUTION ORAL at 08:00

## 2023-03-18 RX ADMIN — KETOROLAC TROMETHAMINE 15 MG: 15 INJECTION, SOLUTION INTRAMUSCULAR; INTRAVENOUS at 00:12

## 2023-03-18 ASSESSMENT — ACTIVITIES OF DAILY LIVING (ADL)
ADLS_ACUITY_SCORE: 21

## 2023-03-18 NOTE — PROGRESS NOTES
Occupational Therapy Discharge Summary    Reason for therapy discharge:    All goals and outcomes met, no further needs identified.    Progress towards therapy goal(s). See goals on Care Plan in [facility] electronic health record for goal details.  Goals met    Therapy recommendation(s):    No further therapy is recommended.

## 2023-03-18 NOTE — PLAN OF CARE
Physical Therapy Discharge Summary    Reason for therapy discharge:    All goals and outcomes met, no further needs identified.    Progress towards therapy goal(s). See goals on Care Plan in Epic electronic health record for goal details.  Goals met    Therapy recommendation(s):    Continued therapy is recommended.  Rationale/Recommendations:  standard outpatient TKA rehab.  Continue home exercise program.

## 2023-03-18 NOTE — PROGRESS NOTES
03/18/23 0900   Appointment Info   Signing Clinician's Name / Credentials (OT) David Dominguez OTR/L   Quick Adds   Quick Adds Certification   Living Environment   People in Home child(maria guadalupe), dependent;parent(s)  (Mother and 14 yo Son)   Current Living Arrangements house   Living Environment Comments Tub/shower with trsf bench, Std toilet with vanity on the R   Self-Care   Usual Activity Tolerance good   Current Activity Tolerance good   Activity/Exercise/Self-Care Comment Has a FW and long shoehorn and crutches.   General Information   Onset of Illness/Injury or Date of Surgery 03/17/23   Referring Physician Dr. Shashank Pro   Patient/Family Therapy Goal Statement (OT) To return home today   Additional Occupational Profile Info/Pertinent History of Current Problem Adm for TKA.  PMH:  Morbid obesity,  anxiety PTSD, Epilepsy, dyslipedemia, MARCELLA   Left Lower Extremity (Weight-bearing Status) weight-bearing as tolerated (WBAT)   Cognitive Status Examination   Orientation Status orientation to person, place and time   Follows Commands WNL   Visual Perception   Visual Impairment/Limitations corrective lenses full-time   Bed Mobility   Bed Mobility supine-sit;sit-supine   Supine-Sit [place] (Bed Mobility) supervision;verbal cues   Sit-Supine [place] (Bed Mobility) supervision;verbal cues   Transfers   Transfers bed-chair transfer;sit-stand transfer;toilet transfer   Transfer Skill: Bed to Chair/Chair to Bed   Bed-Chair [place] (Transfers) supervision;verbal cues   Assistive Device (Bed-Chair Transfers) rolling walker   Sit-Stand Transfer   Sit-Stand [place] (Transfers) supervision;verbal cues   Assistive Device (Sit-Stand Transfers) walker, front-wheeled   Toilet Transfer   Type (Toilet Transfer) sit-stand;stand-sit   [place] Level (Toilet Transfer) modified independence   Assistive Device (Toilet Transfer) walker, front-wheeled   Activities of Daily Living   BADL Assessment/Intervention upper  body dressing;lower body dressing   Upper Body Dressing Assessment/Training   Position (Upper Body Dressing) supported sitting   [place] Level (Upper Body Dressing) independent   Lower Body Dressing Assessment/Training   Position (Lower Body Dressing) supported sitting;supported standing   [place] Level (Lower Body Dressing) minimum assist (75% patient effort)   Clinical Impression   Criteria for Skilled Therapeutic Interventions Met (OT) Yes, treatment indicated   OT Diagnosis decreased indep with ADLs due to TKA   OT Problem List-Impairments impacting ADL problems related to;mobility   Assessment of Occupational Performance 3-5 Performance Deficits   Identified Performance Deficits dressing, toileting, bathing,  G/H and trsfs   Planned Therapy Interventions (OT) ADL retraining   Clinical Decision Making Complexity (OT) moderate complexity   Risk & Benefits of therapy have been explained evaluation/treatment results reviewed   OT Total Evaluation Time   OT Eval, Moderate Complexity Minutes (90523) 15   Therapy Certification   Medical Diagnosis TKA   Start of Care Date 03/18/23   Certification date from 03/18/23   Certification date to 04/18/23   OT Goals   Therapy Frequency (OT) Daily   OT Predicted Duration/Target Date for Goal Attainment 03/18/23   OT Goals Lower Body Dressing;Transfers   OT: Lower Body Dressing Minimal assist;Completed   OT: Transfer Modified independent;Completed   Interventions   Interventions Quick Adds Self-Care/Home Management   Self-Care/Home Management   Self-Care/Home Mgmt/ADL, Compensatory, Meal Prep Minutes (36833) 23   Symptoms Noted During/After Treatment (Meal Preparation/Planning Training) none   Treatment Detail/Skilled Intervention Pt sitting up in her chair when therapist arrived.  Reviewed knee precautions with Pt.  Worked on FB dressing.  VC needed  initially for sequencing.  See below.  Worked on room trsfs with use of FWW and SBA initially for VC for correct hand  placement and technique.  Completed trsfs to chair, toilet, bed.  Therapist reviewed tub/shower trsf with trsf bench, car trsf and briefly kitchen mobility - however, Pt reports she will not be in the kitchen initially.  By end of session, Pt was mod indep with trsfs using her FWW.  At the end of our session, Pt was back sitting in her chair with legs elevated, call light within reach and chair alarm on.   [place] Level (Grooming Training) independent   Assistance (Grooming Training) verbal cues  (VC for hand placement.)   [place] Level (Upper Body Dressing Training) independent   Lower Body Dressing Training Assistance minimum assist (75% patient effort)   Lower Body Dressing Training Assistance 1 person assist  (To don the L sock.  Mother or Son will assist at home.  Pt was told about the sock aid as well.)   [place] Level (Toilet Training) independent  (Pt reports indep with toileting after surgery.  Used Std Ht toilet.)   OT Discharge Planning   OT Plan D/C OT   OT Discharge Recommendation (DC Rec) (S)  home with assist   OT Rationale for DC Rec Will have assist from her mother and son.   OT Brief overview of current status OT goals met.  Mod indep with trsfs  and min A with L/B dressing.  Mod Indep with toileting   Total Session Time   Timed Code Treatment Minutes 23   Total Session Time (sum of timed and untimed services) 38    [facility]  OUTPATIENT OCCUPATIONAL THERAPY  EVALUATION  PLAN OF TREATMENT FOR OUTPATIENT REHABILITATION  (COMPLETE FOR INITIAL CLAIMS ONLY)  Patient's Last Name, First Name, M.I.  YOB: 1985  Crystal Galvan                          Provider's Name  [facility] Medical Record No.  [id number]                             Onset Date:  03/17/23   Start of Care Date:  03/18/23   Type:     ___PT   _X_OT   ___SLP Medical Diagnosis:  TKA                    OT Diagnosis:  decreased indep with ADLs  due to TKA Visits from SOC:  1     See note for plan of treatment, functional goals and certification details    I CERTIFY THE NEED FOR THESE SERVICES FURNISHED UNDER        THIS PLAN OF TREATMENT AND WHILE UNDER MY CARE     (Physician co-signature of this document indicates review and certification of the therapy plan).

## 2023-03-18 NOTE — PROGRESS NOTES
Care Management Follow Up    Length of Stay (days): 0    Expected Discharge Date: 03/18/2023     Concerns to be Addressed: all concerns addressed in this encounter     Patient plan of care discussed at interdisciplinary rounds: Yes    Anticipated Discharge Disposition:  Return home     Anticipated Discharge Services:  none  Anticipated Discharge DME:  As per therapy recommendation    Patient/family educated on Medicare website which has current facility and service quality ratings:  N/A  Education Provided on the Discharge Plan:    Patient/Family in Agreement with the Plan:      Referrals Placed by CM/SW:  none  Private pay costs discussed: Not applicable    Additional Information:  Patient was admitted for scheduled TKA. She is single and lives locally. She will be receiving support from her parents on discharge. They will transport her home. No CM resources/services anticipated.     JERSEY SethSW

## 2023-03-18 NOTE — PLAN OF CARE
Patient vital signs are at baseline: Yes  Patient able to ambulate as they were prior to admission or with assist devices provided by therapies during their stay:  Yes  Patient MUST void prior to discharge:  Yes  Patient able to tolerate oral intake:  Yes  Pain has adequate pain control using Oral analgesics:  Yes  Does patient have an identified :  Yes  Has goal D/C date and time been discussed with patient:  Yes    Scheduled toradol and tylenol effective for pain management. Some irma of leg still numb from block. Pt ambulated, voided. Therapies in AM. Should discharge today back home.       Problem: Knee Arthroplasty  Goal: Optimal Coping  Outcome: Progressing  Goal: Absence of Bleeding  Outcome: Progressing  Goal: Acceptable Pain Control  Outcome: Progressing  Intervention: Prevent or Manage Pain  Recent Flowsheet Documentation  Taken 3/18/2023 0055 by Paulo Woodall RN  Pain Management Interventions:   rest   cold applied  Goal: Effective Oxygenation and Ventilation  Outcome: Progressing  Intervention: Optimize Oxygenation and Ventilation  Recent Flowsheet Documentation  Taken 3/18/2023 0100 by Paulo Woodall RN  Administration (IS): instruction provided, follow-up  Patient Tolerance (IS): fair  Taken 3/17/2023 2000 by Paulo Woodall RN  Administration (IS): instruction provided, follow-up  Incentive Spirometer Predicted Level (mL): 1500  Number of Repetitions (IS): 5  Patient Tolerance (IS): fair  Head of Bed (HOB) Positioning: HOB at 30 degrees

## 2023-03-18 NOTE — PROGRESS NOTES
[facility] Orthopedic Post-Op Note         Assessment and Plan:    Assessment:   Doing well POD #1 s/p left total knee arthroplasty         Plan:   - WBAT, PT/OT this am  - DVT prophylaxis with Xarelto due to patient reported intolerance/refusal of aspirin, frequent ambulation, TEDs, foot/ankle ROM  - Pain control prn with oxycodone/acetaminophen, notes well controlled this am  - Dressing change per Dr. Pro - follow up in clinic 2-3 weeks, earlier as needed  - Anticipate discharge home today with mother/son assistance              Review of Systems:   The patient denies any chest pain, shortness of breath, excessive pain, fever, chills, drainage from the wound, nausea or vomiting.          Medications:     All medications related to the patient's surgery have been reviewed  Current Facility-Administered Medications   Medication     [START ON 3/20/2023] acetaminophen (TYLENOL) tablet 650 mg     acetaminophen (TYLENOL) tablet 975 mg     ALPRAZolam (XANAX) tablet 0.5 mg     atorvastatin (LIPITOR) tablet 40 mg     benzocaine-menthol (CEPACOL) 15-3.6 MG lozenge 1 lozenge     bisacodyl (DULCOLAX) suppository 10 mg     doxepin (SINEquan) 10 MG/ML (HIGH CONC) solution 2 mg     escitalopram (LEXAPRO) tablet 15 mg     HYDROmorphone (DILAUDID) injection 0.2 mg    Or     HYDROmorphone (DILAUDID) injection 0.4 mg     hydrOXYzine (ATARAX) tablet 25 mg     ketorolac (TORADOL) injection 15 mg     lactated ringers infusion     lidocaine (LMX4) cream     lidocaine 1 % 0.1-1 mL     magnesium hydroxide (MILK OF MAGNESIA) suspension 30 mL     methocarbamol (ROBAXIN) tablet 750 mg     naloxone (NARCAN) injection 0.2 mg    Or     naloxone (NARCAN) injection 0.4 mg    Or     naloxone (NARCAN) injection 0.2 mg    Or     naloxone (NARCAN) injection 0.4 mg     ondansetron (ZOFRAN ODT) ODT tab 4 mg    Or     ondansetron (ZOFRAN) injection 4 mg     oxyCODONE (ROXICODONE) tablet 5 mg    Or     oxyCODONE (ROXICODONE) tablet 10  mg     polyethylene glycol (MIRALAX) Packet 17 g     prochlorperazine (COMPAZINE) injection 10 mg    Or     prochlorperazine (COMPAZINE) tablet 10 mg     rivaroxaban ANTICOAGULANT (XARELTO) tablet 10 mg     senna-docusate (SENOKOT-S/PERICOLACE) 8.6-50 MG per tablet 1 tablet     sodium chloride (PF) 0.9% PF flush 3 mL     sodium chloride (PF) 0.9% PF flush 3 mL             Physical Exam:   All vitals stable  Temp: 97.9  F (36.6  C) Temp src: Oral BP: 131/73 Pulse: 79   Resp: 16 SpO2: 96 % O2 Device: BiPAP/CPAP Oxygen Delivery: 4 LPM     Orientation:  alert and oriented to person, place and time     Left lower extremity    Incision:  dressing in place, C/D/I - ACE wrap and ice in place this am.  Supine in bed, NAD, sleeping on arrival this am.  Left knee in near full extension.  Calves soft, N/T.  DF/PF intact without pain. CMS intact, DP pulse 2+.              Data:     All laboratory data related to this surgery reviewed - hemoglobin pending this am  Hemoglobin   Date Value Ref Range Status   02/27/2023 14.2 11.7 - 15.7 g/dL Final   10/01/2019 14.0 12.0 - 16.0 g/dL Final   04/05/2018 14.8 12.0 - 16.0 g/dL Final          Shelbie Sanchez PA-C

## 2023-03-18 NOTE — PROGRESS NOTES
"[facility]    Medicine Progress Note - Hospitalist Service    Date of Admission:  3/17/2023    Assessment & Plan   Crystal Galvan is a 38 year old female admitted on 3/17/2023.  She has a past medical history significant for anxiety, PTSD and epilepsy who underwent elective left total knee arthroplasty on 3/17/2023.  Hospitalist medicine consulted for management of chronic medical conditions.      #Anxiety, PTSD  -No signs of anxiety attack.  -Received her home Lexapro prior to her surgery.    Plan:  [] Continue Lexapro 50 mg daily.  [] Continue home Xanax 0.5 mg as needed  [] Continue home doxepin 2 mg nightly as needed.      #Dyslipidemia    Plan:  [] Resume home atorvastatin.          Diet: Advance Diet as Tolerated: Regular Diet Adult  Discharge Instruction - Regular Diet Adult    DVT Prophylaxis: Defer to primary service  Casper Catheter: Not present  Lines: None     Cardiac Monitoring: None  Code Status: Full Code      Clinically Significant Risk Factors Present on Admission                       # Severe Obesity: Estimated body mass index is 44.01 kg/m  as calculated from the following:    Height as of this encounter: 1.727 m (5' 8\").    Weight as of this encounter: 131.3 kg (289 lb 7.4 oz).           Disposition Plan  Home      Expected Discharge Date: 03/18/2023, 12:00 PM                ALEM ARZATE MD  Hospitalist Service  [facility]  Securely message with [employer] (more info)  Text page via [facility] Paging/Directory   ______________________________________________________________________    Interval History   Patient is feeling well.  She denies any chest pain or shortness of breath.  She denies any lightheadedness.  She denies any significant left knee pain.    Physical Exam   Vital Signs: Temp: 98.6  F (37  C) Temp src: Oral BP: 116/56 Pulse: 80   Resp: 16 SpO2: 95 % O2 Device: None (Room air) Oxygen Delivery: 4 LPM  Weight: 289 lbs 7.42 " oz    Physical Exam  Constitutional:       General: She is not in acute distress.     Appearance: Normal appearance. She is not ill-appearing.   Neurological:      General: No focal deficit present.      Mental Status: She is alert.   Psychiatric:         Mood and Affect: Mood normal.         Medical Decision Making       30 MINUTES SPENT BY ME on the date of service doing chart review, history, exam, documentation & further activities per the note.      Data   Imaging results reviewed over the past 24 hrs:   Recent Results (from the past 24 hour(s))   XR Knee Port Left 1/2 Views    Narrative    EXAM: XR KNEE PORT LEFT 1/2 VIEWS  LOCATION: [facility]  DATE/TIME: 3/17/2023 12:34 PM    INDICATION: Postop total knee.  COMPARISON: None.      Impression    IMPRESSION: Postoperative changes left total knee arthroplasty and patellar resurfacing. Post procedural air within the joint and surrounding soft tissues. Components appear well seated.               Recent Labs   Lab 03/18/23  0803   HGB 12.2   CR 0.74   *

## 2023-08-10 ENCOUNTER — LAB REQUISITION (OUTPATIENT)
Dept: LAB | Facility: CLINIC | Age: 38
End: 2023-08-10

## 2023-08-10 DIAGNOSIS — Z01.419 ENCOUNTER FOR GYNECOLOGICAL EXAMINATION (GENERAL) (ROUTINE) WITHOUT ABNORMAL FINDINGS: ICD-10-CM

## 2023-08-10 DIAGNOSIS — E78.5 HYPERLIPIDEMIA, UNSPECIFIED: ICD-10-CM

## 2023-08-10 LAB
ALBUMIN SERPL BCG-MCNC: 4.4 G/DL (ref 3.5–5.2)
ALP SERPL-CCNC: 103 U/L (ref 35–104)
ALT SERPL W P-5'-P-CCNC: 42 U/L (ref 0–50)
ANION GAP SERPL CALCULATED.3IONS-SCNC: 13 MMOL/L (ref 7–15)
AST SERPL W P-5'-P-CCNC: 27 U/L (ref 0–45)
BILIRUB SERPL-MCNC: 0.7 MG/DL
BUN SERPL-MCNC: 7.5 MG/DL (ref 6–20)
CALCIUM SERPL-MCNC: 9.4 MG/DL (ref 8.6–10)
CHLORIDE SERPL-SCNC: 100 MMOL/L (ref 98–107)
CHOLEST SERPL-MCNC: 166 MG/DL
CREAT SERPL-MCNC: 0.72 MG/DL (ref 0.51–0.95)
DEPRECATED HCO3 PLAS-SCNC: 25 MMOL/L (ref 22–29)
GFR SERPL CREATININE-BSD FRML MDRD: >90 ML/MIN/1.73M2
GLUCOSE SERPL-MCNC: 119 MG/DL (ref 70–99)
HDLC SERPL-MCNC: 37 MG/DL
LDLC SERPL CALC-MCNC: 97 MG/DL
NONHDLC SERPL-MCNC: 129 MG/DL
POTASSIUM SERPL-SCNC: 4.4 MMOL/L (ref 3.4–5.3)
PROT SERPL-MCNC: 7 G/DL (ref 6.4–8.3)
SODIUM SERPL-SCNC: 138 MMOL/L (ref 136–145)
TRIGL SERPL-MCNC: 159 MG/DL

## 2023-08-10 PROCEDURE — G0145 SCR C/V CYTO,THINLAYER,RESCR: HCPCS | Performed by: FAMILY MEDICINE

## 2023-08-10 PROCEDURE — 87624 HPV HI-RISK TYP POOLED RSLT: CPT | Performed by: FAMILY MEDICINE

## 2023-08-10 PROCEDURE — 80053 COMPREHEN METABOLIC PANEL: CPT | Performed by: FAMILY MEDICINE

## 2023-08-10 PROCEDURE — 80061 LIPID PANEL: CPT | Performed by: FAMILY MEDICINE

## 2023-08-15 LAB
BKR LAB AP GYN ADEQUACY: NORMAL
BKR LAB AP GYN INTERPRETATION: NORMAL
BKR LAB AP HPV REFLEX: NORMAL
BKR LAB AP LMP: NORMAL
BKR LAB AP PREVIOUS ABNORMAL: NORMAL
PATH REPORT.COMMENTS IMP SPEC: NORMAL
PATH REPORT.COMMENTS IMP SPEC: NORMAL
PATH REPORT.RELEVANT HX SPEC: NORMAL

## 2023-08-16 LAB
HUMAN PAPILLOMA VIRUS 16 DNA: NEGATIVE
HUMAN PAPILLOMA VIRUS 18 DNA: NEGATIVE
HUMAN PAPILLOMA VIRUS FINAL DIAGNOSIS: NORMAL
HUMAN PAPILLOMA VIRUS OTHER HR: NEGATIVE

## 2023-10-19 NOTE — ANESTHESIA PROCEDURE NOTES
"Intrathecal injection Procedure Note    Pre-Procedure   Staff -        Anesthesiologist:  Lorraine Rajan MD       Performed By: anesthesiologist       Location: OR       Procedure Start/Stop Times: 3/17/2023 9:54 AM and 3/17/2023 10:00 AM       Pre-Anesthestic Checklist: patient identified, IV checked, risks and benefits discussed, informed consent, monitors and equipment checked, pre-op evaluation, at physician/surgeon's request and post-op pain management  Timeout:       Correct Patient: Yes        Correct Procedure: Yes        Correct Site: Yes        Correct Position: Yes   Procedure Documentation  Procedure: intrathecal injection       Patient Position: sitting       Patient Prep/Sterile Barriers: sterile gloves, mask, patient draped       Skin prep: Chloraprep       Insertion Site: L3-4. (midline approach).       Needle Gauge: 24.        Needle Length (Inches): 4        Spinal Needle Type: Pencan       Introducer used       # of attempts: 1 and  # of redirects:     Assessment/Narrative         Paresthesias: No.       CSF fluid: clear.    Medication(s) Administered   0.75% Hyperbaric Bupivacaine (Intrathecal) - Intrathecal   1.6 mL - 3/17/2023 9:54:00 AM  Medication Administration Time: 3/17/2023 9:54 AM      FOR [facility] ONLY:   Pain Team Contact information: please page the Pain Team Via [employer]. Search \"Pain\". During daytime hours, please page the attending first. At night please page the resident first.    " Griseofulvin Counseling:  I discussed with the patient the risks of griseofulvin including but not limited to photosensitivity, cytopenia, liver damage, nausea/vomiting and severe allergy.  The patient understands that this medication is best absorbed when taken with a fatty meal (e.g., ice cream or french fries).

## 2024-09-16 ENCOUNTER — LAB REQUISITION (OUTPATIENT)
Dept: LAB | Facility: CLINIC | Age: 39
End: 2024-09-16

## 2024-09-16 DIAGNOSIS — E66.01 MORBID (SEVERE) OBESITY DUE TO EXCESS CALORIES (H): ICD-10-CM

## 2024-09-16 DIAGNOSIS — E78.5 HYPERLIPIDEMIA, UNSPECIFIED: ICD-10-CM

## 2024-09-16 DIAGNOSIS — R23.2 FLUSHING: ICD-10-CM

## 2024-09-16 PROCEDURE — 83001 ASSAY OF GONADOTROPIN (FSH): CPT | Performed by: FAMILY MEDICINE

## 2024-09-16 PROCEDURE — 80053 COMPREHEN METABOLIC PANEL: CPT | Performed by: FAMILY MEDICINE

## 2024-09-16 PROCEDURE — 80061 LIPID PANEL: CPT | Performed by: FAMILY MEDICINE

## 2024-09-16 PROCEDURE — 84443 ASSAY THYROID STIM HORMONE: CPT | Performed by: FAMILY MEDICINE

## 2024-09-17 LAB
ALBUMIN SERPL BCG-MCNC: 4.5 G/DL (ref 3.5–5.2)
ALP SERPL-CCNC: 100 U/L (ref 40–150)
ALT SERPL W P-5'-P-CCNC: 32 U/L (ref 0–50)
ANION GAP SERPL CALCULATED.3IONS-SCNC: 11 MMOL/L (ref 7–15)
AST SERPL W P-5'-P-CCNC: 26 U/L (ref 0–45)
BILIRUB SERPL-MCNC: 0.6 MG/DL
BUN SERPL-MCNC: 11.9 MG/DL (ref 6–20)
CALCIUM SERPL-MCNC: 9.7 MG/DL (ref 8.8–10.4)
CHLORIDE SERPL-SCNC: 100 MMOL/L (ref 98–107)
CHOLEST SERPL-MCNC: 176 MG/DL
CREAT SERPL-MCNC: 0.79 MG/DL (ref 0.51–0.95)
EGFRCR SERPLBLD CKD-EPI 2021: >90 ML/MIN/1.73M2
FASTING STATUS PATIENT QL REPORTED: ABNORMAL
FASTING STATUS PATIENT QL REPORTED: NORMAL
FSH SERPL IRP2-ACNC: 6.8 MIU/ML
GLUCOSE SERPL-MCNC: 72 MG/DL (ref 70–99)
HCO3 SERPL-SCNC: 27 MMOL/L (ref 22–29)
HDLC SERPL-MCNC: 37 MG/DL
LDLC SERPL CALC-MCNC: 111 MG/DL
NONHDLC SERPL-MCNC: 139 MG/DL
POTASSIUM SERPL-SCNC: 4.5 MMOL/L (ref 3.4–5.3)
PROT SERPL-MCNC: 7.8 G/DL (ref 6.4–8.3)
SODIUM SERPL-SCNC: 138 MMOL/L (ref 135–145)
TRIGL SERPL-MCNC: 139 MG/DL
TSH SERPL DL<=0.005 MIU/L-ACNC: 1.24 UIU/ML (ref 0.3–4.2)

## 2024-10-07 ENCOUNTER — OFFICE VISIT (OUTPATIENT)
Dept: PHARMACY | Facility: PHYSICIAN GROUP | Age: 39
End: 2024-10-07
Payer: COMMERCIAL

## 2024-10-07 DIAGNOSIS — E66.01 MORBID OBESITY (H): Primary | ICD-10-CM

## 2024-10-07 DIAGNOSIS — Z30.018 HORMONAL CONTRACEPTIVE: ICD-10-CM

## 2024-10-07 DIAGNOSIS — T75.3XXS MOTION SICKNESS, SEQUELA: ICD-10-CM

## 2024-10-07 DIAGNOSIS — Z78.9 TAKES DIETARY SUPPLEMENTS: ICD-10-CM

## 2024-10-07 DIAGNOSIS — F41.9 ANXIETY: ICD-10-CM

## 2024-10-07 DIAGNOSIS — G47.00 INSOMNIA, UNSPECIFIED TYPE: ICD-10-CM

## 2024-10-07 DIAGNOSIS — E78.5 HYPERLIPIDEMIA, UNSPECIFIED HYPERLIPIDEMIA TYPE: ICD-10-CM

## 2024-10-07 PROCEDURE — 99607 MTMS BY PHARM ADDL 15 MIN: CPT | Performed by: PHARMACIST

## 2024-10-07 PROCEDURE — 99605 MTMS BY PHARM NP 15 MIN: CPT | Performed by: PHARMACIST

## 2024-10-07 NOTE — PROGRESS NOTES
Medication Therapy Management (MTM) Encounter    ASSESSMENT:                            Medication Adherence/Access:   No issues identified. Prior authorization will be needed for Zepbound and completed in cover my meds after visit.   _  Weight Management   BMI is >30 and she is a good candidate for GLP-1 agonist along with diet changes and exercise to help with weight loss goals.   Higher dose semaglutide has been helping appetite but she is having more side effects. Given poor response to semaglutide so far she may benefit from switching to tirzepatide. Reviewed at length diet and lifestyle recommendations to help minimize side effects and improve success with GLP-1 therapy.   Completed teaching of administration of Zepbound. Discussed mechanism of action, side effects, warnings, and efficacy. Discussed appropriate storage and stability. Answered patient questions.   _  Hyperlipidemia   Stable.   _  Mental Health   Stable. Follow-up plan in place with mental health provider.   _  Hormonal Contraceptive    Stable.   _  Motion Sickness    Stable.   _  Supplements   Stable.   _____________________  PLAN:                            Finish last dose of Wegovy 2.4 mg tomorrow  Sent order for Zepbound to pharmacy  Week 1-4: Inject 2.5 mg once weekly  Week 5-8: If tolerating, can increase to 5 mg once weekly  Week 9-12: If tolerating, can increase to 7.5 mg once weekly  Week 13-16: If tolerating, can increase to 10 mg once weekly   Week 17-20: If tolerating, can increase to 12.5 mg once weekly   Week 21 and on: if tolerating, can increase to 15 mg once weekly     Your insurance may require a prior authorization so check in with your pharmacy in about a week to see if it has been approved.     Tips to help it work best for you:  - Eat slow  - Start with 1/2 of what you normally eat and see how you feel, if still hungry after 20 minutes go back for more  - Try to avoid high fat, fried, greasy foods  - Drink plenty of water  and get fiber in your diet  - Do injection at night before bedtime  - Make sure to eat protein  - Include 15-20 gm protein at each meal, along with protein containing snack of 15-30 gm protein, to reach goal of 60-80 gm protein daily.  - Increase fluid intake to 64oz daily: choose plain or calorie/carbonation-free beverages.  - Read food labels more consistently: keeping total fat grams <10, total sugar grams <10, fiber >3gm per serving.  - Try to have a vegetable or fruit with each meal daily.  - Practice plate method: 1/2 plate lean/low fat protein source, vegetable/fruit, <25% of plate complex carbohydrates.  - Practice eating off of smaller plates/bowls, chewing to applesauce consistency, taking 20-30 minutes to eat in a calm/relaxed environment without distractions of tv/email/cell phone.  - Incorporate daily structured activity, 30-60 minutes most days of the week    Some details I like to point out:   - Take once a week, on the same day  - You can take with or without food. Recommend taking before bedtime.  - If you miss a dose, take the missed dose as soon as possible within 4 days (96 hours) after the missed dose. If more than 4 days have passed, skip the missed dose and administer the next dose on the regularly scheduled day.  - Can be injected under the skin of your abdomen, thigh, or upper arm. Most people use their abdomen.   - Keep unused pens in the refrigerator. If needed, each single-dose pen can be stored unrefrigerated at temperatures not to exceed 30 C (86 F) for up to 21 days. I recommend taking pen out of the refrigerator 15-30 minutes before giving the injection.   - Most common side effects are include nausea, vomiting, diarrhea, stomach (abdominal) pain, and constipation. Theses side effects usually improve after your body gets used to the medicine in a couple weeks. But if you experience abdominal pain that is severe and will not go away please call your doctor or go to Urgent Care/  Emergency Room.     Follow-up: Return in 4 weeks (on 11/4/2024) for with me, using a phone visit at 10:30 AM .    SUBJECTIVE/OBJECTIVE:                          Crystal Galvan is a 39 year old female seen for an initial visit. She was referred to me from Dr. Chiu.      Reason for visit: Weight management medication.    Allergies/ADRs: Reviewed in chart  Past Medical History: Reviewed in chart  Tobacco: She reports that she has never smoked. She has never used smokeless tobacco.  Alcohol: not currently using    Medication Adherence/Access: no issues reported.    Weight Management     Wegovy 2.4 mg once weekly (Tuesday)  Patient reports the following medication side effects: nausea and stomach upset after increasing dose. She does not like how she feels on it. She is interested in trying an alternative. She did not have side effects with lower doses and also didn't feel like they were helpful. She gained weight at her most recent appointment despite taking Wegovy for several months.   Nutrition/Eating Habits: she has had less appetite since increasing Wegovy dose but has had ongoing issues with sugar cravings.    Exercise/Activity: walking more, trying to be more consistent with this.   Medications Tried/Failed:  Phentermine: trouble sleeping  Contrave- prescribed previously but has history of childhood seizures so was worried about risks    Initial Consult Weight: 298       Hyperlipidemia     Atorvastatin 40 mg daily  Patient reports no significant myalgias or other side effects.     Mental Health     Anxiety and Insomnia  Duloxetine 60 mg once daily  Doxepin 10 mg/mL liquid 0.5 mL at bedtime   Alprazolam 0.5 mg daily as needed   Follows with mental health specialist who prescribes medication.   Patient reports no current medication side effects.  Patient reports symptoms are stable.       Hormonal Contraceptive    Mirena IUD  No issues reported.       Motion Sickness    Meclizine 25 mg 1 tablet as needed   Uses  this for motion sickness in cars and traveling. No issues reported.       Supplements     Vitamin D3 1000 units once daily  Fish oil 600 mg once daily  Multivitamin 1 tablet once daily  No reported issues at this time.        Today's Vitals: /62 (BP Location: Left arm, Patient Position: Sitting, Cuff Size: Adult Large)   ----------------      I spent 50 minutes with this patient today. All changes were made via collaborative practice agreement with Simona Chiu MD. A copy of the visit note was provided to the patient's provider(s).    A summary of these recommendations was given to the patient.    Ama Peña, PharmD, BCACP  Medication Therapy Management Pharmacist  [facility]  [phone]           Medication Therapy Recommendations  Morbid obesity (H)    Current Medication: tirzepatide-Weight Management (ZEPBOUND) 2.5 MG/0.5ML prefilled pen   Rationale: Condition refractory to medication - Ineffective medication - Effectiveness   Recommendation: Change Medication - Zepbound 2.5 MG/0.5ML Soaj   Status: Accepted per CPA

## 2024-10-07 NOTE — PATIENT INSTRUCTIONS
Recommendations from today's MTM visit:                                                    MTM (medication therapy management) is a service provided by a clinical pharmacist designed to help you get the most of out of your medicines.      Finish last dose of Wegovy 2.4 mg tomorrow  Sent order for Zepbound to pharmacy  Week 1-4: Inject 2.5 mg once weekly  Week 5-8: If tolerating, can increase to 5 mg once weekly  Week 9-12: If tolerating, can increase to 7.5 mg once weekly  Week 13-16: If tolerating, can increase to 10 mg once weekly   Week 17-20: If tolerating, can increase to 12.5 mg once weekly   Week 21 and on: if tolerating, can increase to 15 mg once weekly     Your insurance may require a prior authorization so check in with your pharmacy in about a week to see if it has been approved.     Tips to help it work best for you:  - Eat slow  - Start with 1/2 of what you normally eat and see how you feel, if still hungry after 20 minutes go back for more  - Try to avoid high fat, fried, greasy foods  - Drink plenty of water and get fiber in your diet  - Do injection at night before bedtime  - Make sure to eat protein  - Include 15-20 gm protein at each meal, along with protein containing snack of 15-30 gm protein, to reach goal of 60-80 gm protein daily.  - Increase fluid intake to 64oz daily: choose plain or calorie/carbonation-free beverages.  - Read food labels more consistently: keeping total fat grams <10, total sugar grams <10, fiber >3gm per serving.  - Try to have a vegetable or fruit with each meal daily.  - Practice plate method: 1/2 plate lean/low fat protein source, vegetable/fruit, <25% of plate complex carbohydrates.  - Practice eating off of smaller plates/bowls, chewing to applesauce consistency, taking 20-30 minutes to eat in a calm/relaxed environment without distractions of tv/email/cell phone.  - Incorporate daily structured activity, 30-60 minutes most days of the week    Some details I like to  point out:   - Take once a week, on the same day  - You can take with or without food. Recommend taking before bedtime.  - If you miss a dose, take the missed dose as soon as possible within 4 days (96 hours) after the missed dose. If more than 4 days have passed, skip the missed dose and administer the next dose on the regularly scheduled day.  - Can be injected under the skin of your abdomen, thigh, or upper arm. Most people use their abdomen.   - Keep unused pens in the refrigerator. If needed, each single-dose pen can be stored unrefrigerated at temperatures not to exceed 30 C (86 F) for up to 21 days. I recommend taking pen out of the refrigerator 15-30 minutes before giving the injection.   - Most common side effects are include nausea, vomiting, diarrhea, stomach (abdominal) pain, and constipation. Theses side effects usually improve after your body gets used to the medicine in a couple weeks. But if you experience abdominal pain that is severe and will not go away please call your doctor or go to Urgent Care/ Emergency Room.     For Prevention and Treatment of Constipation     From least aggressive to most aggressive:     Move: wallking-the more we move, the more our bowels move  Water: Drink water-64oz+ day  Go when you need to go. Don't wait. The longer you wait, the harder it gets.  Fiber: Fruit, raw veggies, nuts, whole grains,   Stool Softeners: if constipation is mild and for maintenance  Gentle laxatives: Miralax, senokot, dulcolax , Smooth move tea as needed     More aggressive (and typically won't get to this point)  Milk of Magnesia  Mag Citrate (what you drink before a colonoscopy)  Suppositories  Enema       Follow-up: Return in 4 weeks (on 11/4/2024) for with me, using a phone visit at 10:30 AM .    It was great speaking with you today.  I value your experience and would be very thankful for your time in providing feedback in our clinic survey. In the next few days, you may receive an email or  text message from [employer] with a link to a survey related to your clinical pharmacist.    To schedule another MTM appointment, please call [phone].    My Clinical Pharmacist's contact information:                                                      Please feel free to contact me with any questions or concerns you have.      Ama Pñea, PharmD, [facility]  Medication Therapy Management Pharmacist  [facility]  [phone]

## 2024-10-11 VITALS — DIASTOLIC BLOOD PRESSURE: 62 MMHG | SYSTOLIC BLOOD PRESSURE: 103 MMHG

## 2024-10-11 RX ORDER — SEMAGLUTIDE 2.4 MG/.75ML
2.4 INJECTION, SOLUTION SUBCUTANEOUS WEEKLY
COMMUNITY
Start: 2024-09-16 | End: 2024-10-11

## 2024-10-11 RX ORDER — DULOXETIN HYDROCHLORIDE 60 MG/1
60 CAPSULE, DELAYED RELEASE ORAL DAILY
COMMUNITY

## 2024-12-02 ENCOUNTER — LAB REQUISITION (OUTPATIENT)
Dept: LAB | Facility: CLINIC | Age: 39
End: 2024-12-02

## 2024-12-02 DIAGNOSIS — J02.9 ACUTE PHARYNGITIS, UNSPECIFIED: ICD-10-CM

## 2024-12-02 PROCEDURE — 87081 CULTURE SCREEN ONLY: CPT | Performed by: FAMILY MEDICINE

## 2024-12-04 LAB — BACTERIA SPEC CULT: NORMAL

## 2024-12-05 ENCOUNTER — VIRTUAL VISIT (OUTPATIENT)
Dept: PHARMACY | Facility: PHYSICIAN GROUP | Age: 39
End: 2024-12-05
Payer: COMMERCIAL

## 2024-12-05 DIAGNOSIS — E66.01 MORBID OBESITY (H): Primary | ICD-10-CM

## 2024-12-05 NOTE — PROGRESS NOTES
Medication Therapy Management (MTM) Encounter    ASSESSMENT:                            Medication Adherence/Access: No issues identified.  _  Weight Management   BMI is >30 and she is a good candidate for GLP-1 agonist along with diet changes and exercise to help with weight loss goals.   Tolerating current Zepbound dose well. Given ongoing cravings she would benefit from escalating dose.   _______________________  PLAN:                            Continue Zepbound 5 mg this Sunday (12/8). Then, next Sunday (12/15) increase Zepbound to 7.5 mg once a week.     Follow-up: Return in 4 weeks (on 1/2/2025) for medication therapy management, with me, using a phone visit at 3:30 pm .    SUBJECTIVE/OBJECTIVE:                          Crystal Galvan is a 39 year old female seen for a follow-up visit.       Reason for visit: Weight management follow-up.    Allergies/ADRs: Reviewed in chart  Past Medical History: Reviewed in chart  Tobacco: She reports that she has never smoked. She has never used smokeless tobacco.  Alcohol: not currently using    Medication Adherence/Access: no issues reported.    Weight Management   Zepbound 5 mg once a week (Sunday)    She has been on 5 mg dose now for three weeks and has not experienced any significant side effects. She has noticed a decrease in portion sizes when eating, but struggles with sugar cravings. She would like to increase dose.   Nutrition/Eating Habits: portion sizes are smaller but still has sugar cravings  Exercise/Activity: walking more, trying to be more consistent with this.   Medications Tried/Failed:  Phentermine: trouble sleeping  Contrave- prescribed previously but has history of childhood seizures so was worried about risks  Wegovy- severe nausea and stomach issues with highest dose    Initial Consult Weight: 298       Today's Vitals: There were no vitals taken for this visit.  ----------------      I spent 5 minutes with this patient today. All changes were  made via collaborative practice agreement with Simona Chiu MD. A copy of the visit note was provided to the patient's provider(s).    A summary of these recommendations was sent via clinic portal.    Ama Peña, PharmD, BCACP  Medication Therapy Management Pharmacist  [facility]  [phone]      Telemedicine Visit Details  The patient's medications can be safely assessed via a telemedicine encounter.  Type of service:  Telephone visit  Originating Location (pt. Location): Home    Distant Location (provider location):  On-site  Start Time:  1:40 PM  End Time: 1:45 PM     Medication Therapy Recommendations  Morbid obesity (H)   1 Current Medication: tirzepatide-weight management 5 MG/0.5ML SOLN   Current Medication Sig: Inject 5 mg subcutaneously every 7 days.   Rationale: Dose too low - Dosage too low - Effectiveness   Recommendation: Increase Dose - Zepbound 7.5 MG/0.5ML Soaj   Status: Accepted per CPA   Identified Date: 12/5/2024 Completed Date: 12/5/2024

## 2024-12-05 NOTE — PATIENT INSTRUCTIONS
Recommendations from today's MTM visit:                                                         Continue Zepbound 5 mg this Sunday (12/8). Then, next Sunday (12/15) increase Zepbound to 7.5 mg once a week.     Follow-up: Return in 4 weeks (on 1/2/2025) for medication therapy management, with me, using a phone visit at 3:30 pm .    It was great speaking with you today.  I value your experience and would be very thankful for your time in providing feedback in our clinic survey. In the next few days, you may receive an email or text message from [employer] with a link to a survey related to your clinical pharmacist.    To schedule another MTM appointment, please call [phone].    My Clinical Pharmacist's contact information:                                                      Please feel free to contact me with any questions or concerns you have.      Ama Peña, PharmD, BCACP  Medication Therapy Management Pharmacist  [facility] [place]  [phone]

## 2025-01-02 ENCOUNTER — VIRTUAL VISIT (OUTPATIENT)
Dept: PHARMACY | Facility: PHYSICIAN GROUP | Age: 40
End: 2025-01-02
Payer: COMMERCIAL

## 2025-01-02 DIAGNOSIS — G47.00 INSOMNIA, UNSPECIFIED TYPE: ICD-10-CM

## 2025-01-02 DIAGNOSIS — F41.9 ANXIETY: ICD-10-CM

## 2025-01-02 DIAGNOSIS — Z78.9 TAKES DIETARY SUPPLEMENTS: ICD-10-CM

## 2025-01-02 DIAGNOSIS — E66.01 MORBID OBESITY (H): Primary | ICD-10-CM

## 2025-01-02 DIAGNOSIS — E78.5 HYPERLIPIDEMIA, UNSPECIFIED HYPERLIPIDEMIA TYPE: ICD-10-CM

## 2025-01-02 DIAGNOSIS — Z30.018 HORMONAL CONTRACEPTIVE: ICD-10-CM

## 2025-01-02 DIAGNOSIS — T75.3XXS MOTION SICKNESS, SEQUELA: ICD-10-CM

## 2025-01-02 NOTE — PROGRESS NOTES
Medication Therapy Management (MTM) Encounter    ASSESSMENT:                            Medication Adherence/Access: No issues identified.  _  Weight Management   BMI is >30 with conditions of hyperlipidemia and obstructive sleep apnea and would benefit from ongoing GLP-1 therapy.   Cumulative Weight Loss: -13 lb, -4.4% from baseline with switching from Wegovy to Zepbound.   Tolerating current dose well and would benefit from continuing dose escalation.   _  Hyperlipidemia   Stable.   _  Anxiety and Insomnia  Stable. Follow-up plan in place with psych provider.   _  Hormonal Contraceptive    Stable.  _  Motion Sickness    Stable.   _  Supplements   Stable.   ____________________________  PLAN:                            Continue Zepbound 7.5 mg this Sunday (1/5). Then, next Sunday (1/12) increase Zepbound to 10 mg once a week.     Follow-up: Return in 25 days (on 1/27/2025) for medication therapy management, with me, using a phone visit at 3:30 pm .    SUBJECTIVE/OBJECTIVE:                          Crystal Galvan is a 40 year old female seen for a follow-up visit.       Reason for visit: Medication check.    Allergies/ADRs: Reviewed in chart  Past Medical History: Reviewed in chart  Tobacco: She reports that she has never smoked. She has never used smokeless tobacco.  Alcohol: not currently using    Medication Adherence/Access:   No issues reported. She reports getting paperwork in to continue with [employer] insurance through the state so she thinks this should be active as of the first of the year, she has not heard otherwise.     Weight Management   Zepbound 7.5 mg once a week (Sunday)    She has been on 7.5 mg dose now for three weeks and has not experienced any significant side effects. She has noticed a decrease in portion sizes when eating, but still struggles with sugar cravings. She would like to increase dose. She has not had the same issues she had with high dose Wegovy but is frustrated with slow weight  loss.   Nutrition/Eating Habits: portion sizes are smaller but still has sugar cravings  Exercise/Activity: walking more, trying to be more consistent with this.   Medications Tried/Failed:  Phentermine: trouble sleeping  Contrave- prescribed previously but has history of childhood seizures so was worried about risks  Wegovy- severe nausea and stomach issues with highest dose    Initial Consult Weight: 298     Current weight today: 285 lb  Cumulative Weight Loss: -13 lb, -4.4% from baseline      Hyperlipidemia   Atorvastatin 40 mg daily  Patient reports no significant myalgias or other side effects.     Mental Health   Anxiety and Insomnia  Duloxetine 60 mg once daily  Doxepin 10 mg/mL liquid 0.5 mL at bedtime - rare  Alprazolam 0.5 mg daily as needed   Follows with mental health specialist who prescribes medication.   Patient reports no current medication side effects.  Patient reports symptoms are stable.     Hormonal Contraceptive    Mirena IUD  No issues reported.       Motion Sickness    Meclizine 25 mg 1 tablet as needed   Uses this for motion sickness in cars and traveling. No issues reported.       Supplements   Vitamin D3 1000 units once daily  Fish oil 600 mg once daily  Multivitamin 1 tablet once daily  No reported issues at this time.          Today's Vitals: There were no vitals taken for this visit.  ----------------      I spent 8 minutes with this patient today. All changes were made via collaborative practice agreement with Simona Chiu MD.     A summary of these recommendations was sent via clinic portal.    Ama Peña, PharmD, BCACP  Medication Therapy Management Pharmacist  [facility]  [phone]      Telemedicine Visit Details  The patient's medications can be safely assessed via a telemedicine encounter.  Type of service:  Telephone visit  Originating Location (pt. Location): Home    Distant Location (provider location):  On-site  Start Time:  3:29 PM  End Time:  3:37 PM      Medication Therapy Recommendations  No medication therapy recommendations to display

## 2025-01-08 RX ORDER — TIRZEPATIDE 10 MG/.5ML
10 INJECTION, SOLUTION SUBCUTANEOUS
COMMUNITY

## 2025-01-08 NOTE — PATIENT INSTRUCTIONS
Recommendations from today's MTM visit:                                                       Continue Zepbound 7.5 mg this Sunday (1/5). Then, next Sunday (1/12) increase Zepbound to 10 mg once a week.     Follow-up: Return in 25 days (on 1/27/2025) for medication therapy management, with me, using a phone visit at 3:30 pm .    It was great speaking with you today.  I value your experience and would be very thankful for your time in providing feedback in our clinic survey. In the next few days, you may receive an email or text message from [employer] with a link to a survey related to your clinical pharmacist.    To schedule another MTM appointment, please call [phone].    My Clinical Pharmacist's contact information:                                                      Please feel free to contact me with any questions or concerns you have.      Ama Peña, PharmD, BCACP  Medication Therapy Management Pharmacist  [facility]  [phone]

## (undated) DEVICE — DECANTER VIAL 2006S

## (undated) DEVICE — SOL NACL 0.9% IRRIG 1000ML BOTTLE 2F7124

## (undated) DEVICE — SOL WATER IRRIG 1000ML BOTTLE 2F7114

## (undated) DEVICE — CUSTOM PACK TOTAL KNEE SOP5BTKHEC

## (undated) DEVICE — SUTURE VICRYL+ 0 27IN CT-1 UND VCP260H

## (undated) DEVICE — DRESSING MEPILEX AG SILVER 4X12 395990

## (undated) DEVICE — SOLUTION IRRIG 2B7127 .9NS 3000ML BAG

## (undated) DEVICE — GLOVE BIOGEL PI ORTHOPRO SZ 7.5 47675

## (undated) DEVICE — CUSTOM PACK TOTAL KNEE ACCESSORY SOP5BTAHEA

## (undated) DEVICE — SUTURE VICRYL+ 2-0 27IN CT-1 UND VCP259H

## (undated) DEVICE — PREP CHLORAPREP W/ORANGE TINT 10.5ML 930715

## (undated) DEVICE — SU ETHIBOND 5 V-37 4X30" MB66G

## (undated) DEVICE — CAST PADDING 6" STERILE 9046S

## (undated) DEVICE — SU MONOCRYL 3-0 PS-2 18" UND MCP497G

## (undated) DEVICE — A3 SUPPLIES- SEE NURSING INFO PAGE

## (undated) DEVICE — GOWN IMPERVIOUS BREATHABLE SMART XLG 89045

## (undated) DEVICE — BLADE SAW SAGITTAL STRK WIDE 25.4X85X1.2MM 2108-151-000

## (undated) DEVICE — SUCTION MANIFOLD NEPTUNE 2 SYS 4 PORT 0702-020-000

## (undated) DEVICE — GLOVE BIOGEL INDICATOR 7.5 LF 41675

## (undated) DEVICE — HOLDER LIMB VELCRO OR 0814-1533

## (undated) DEVICE — GLOVE BIOGEL PI INDICATOR 8.0 LF 41680

## (undated) DEVICE — SU STRATAFIX PDS PLUS 1 CT-1 18" SXPP1A404

## (undated) DEVICE — PLATE GROUNDING ADULT W/CORD 9165L

## (undated) DEVICE — GLOVE BIOGEL PI ULTRATOUCH G SZ 7.5 42175

## (undated) RX ORDER — PROPOFOL 10 MG/ML
INJECTION, EMULSION INTRAVENOUS
Status: DISPENSED
Start: 2023-03-17

## (undated) RX ORDER — FENTANYL CITRATE-0.9 % NACL/PF 10 MCG/ML
PLASTIC BAG, INJECTION (ML) INTRAVENOUS
Status: DISPENSED
Start: 2023-03-17